# Patient Record
Sex: FEMALE | Race: WHITE | Employment: OTHER | URBAN - METROPOLITAN AREA
[De-identification: names, ages, dates, MRNs, and addresses within clinical notes are randomized per-mention and may not be internally consistent; named-entity substitution may affect disease eponyms.]

---

## 2018-10-23 ENCOUNTER — APPOINTMENT (OUTPATIENT)
Dept: CT IMAGING | Facility: CLINIC | Age: 33
End: 2018-10-23
Attending: EMERGENCY MEDICINE
Payer: OTHER GOVERNMENT

## 2018-10-23 ENCOUNTER — HOSPITAL ENCOUNTER (EMERGENCY)
Facility: CLINIC | Age: 33
Discharge: HOME | End: 2018-10-24
Attending: EMERGENCY MEDICINE | Admitting: EMERGENCY MEDICINE
Payer: OTHER GOVERNMENT

## 2018-10-23 ENCOUNTER — OFFICE VISIT (OUTPATIENT)
Dept: URGENT CARE | Facility: URGENT CARE | Age: 33
End: 2018-10-23
Payer: OTHER GOVERNMENT

## 2018-10-23 VITALS
TEMPERATURE: 97.8 F | OXYGEN SATURATION: 98 % | WEIGHT: 121.3 LBS | HEART RATE: 110 BPM | RESPIRATION RATE: 12 BRPM | BODY MASS INDEX: 21.49 KG/M2 | SYSTOLIC BLOOD PRESSURE: 126 MMHG | DIASTOLIC BLOOD PRESSURE: 78 MMHG

## 2018-10-23 DIAGNOSIS — R51.9 NONINTRACTABLE HEADACHE, UNSPECIFIED CHRONICITY PATTERN, UNSPECIFIED HEADACHE TYPE: ICD-10-CM

## 2018-10-23 DIAGNOSIS — R51.9 ACUTE INTRACTABLE HEADACHE, UNSPECIFIED HEADACHE TYPE: Primary | ICD-10-CM

## 2018-10-23 LAB
ALBUMIN SERPL-MCNC: 3.5 G/DL (ref 3.4–5)
ALP SERPL-CCNC: 73 U/L (ref 40–150)
ALT SERPL W P-5'-P-CCNC: 29 U/L (ref 0–50)
ANION GAP SERPL CALCULATED.3IONS-SCNC: 6 MMOL/L (ref 3–14)
AST SERPL W P-5'-P-CCNC: 25 U/L (ref 0–45)
BASOPHILS # BLD AUTO: 0 10E9/L (ref 0–0.2)
BASOPHILS NFR BLD AUTO: 0.2 %
BILIRUB SERPL-MCNC: 0.6 MG/DL (ref 0.2–1.3)
BUN SERPL-MCNC: 9 MG/DL (ref 7–30)
CALCIUM SERPL-MCNC: 8.3 MG/DL (ref 8.5–10.1)
CHLORIDE SERPL-SCNC: 106 MMOL/L (ref 94–109)
CO2 SERPL-SCNC: 28 MMOL/L (ref 20–32)
CREAT SERPL-MCNC: 0.78 MG/DL (ref 0.52–1.04)
DEPRECATED S PYO AG THROAT QL EIA: NORMAL
DIFFERENTIAL METHOD BLD: NORMAL
EOSINOPHIL # BLD AUTO: 0.2 10E9/L (ref 0–0.7)
EOSINOPHIL NFR BLD AUTO: 1.5 %
ERYTHROCYTE [DISTWIDTH] IN BLOOD BY AUTOMATED COUNT: 12 % (ref 10–15)
GFR SERPL CREATININE-BSD FRML MDRD: 84 ML/MIN/1.7M2
GLUCOSE SERPL-MCNC: 83 MG/DL (ref 70–99)
HCG SERPL QL: NEGATIVE
HCT VFR BLD AUTO: 40.8 % (ref 35–47)
HGB BLD-MCNC: 14.4 G/DL (ref 11.7–15.7)
IMM GRANULOCYTES # BLD: 0 10E9/L (ref 0–0.4)
IMM GRANULOCYTES NFR BLD: 0.2 %
LACTATE BLD-SCNC: 0.4 MMOL/L (ref 0.7–2)
LYMPHOCYTES # BLD AUTO: 2.2 10E9/L (ref 0.8–5.3)
LYMPHOCYTES NFR BLD AUTO: 20.4 %
MCH RBC QN AUTO: 32.1 PG (ref 26.5–33)
MCHC RBC AUTO-ENTMCNC: 35.3 G/DL (ref 31.5–36.5)
MCV RBC AUTO: 91 FL (ref 78–100)
MONOCYTES # BLD AUTO: 1.3 10E9/L (ref 0–1.3)
MONOCYTES NFR BLD AUTO: 11.8 %
NEUTROPHILS # BLD AUTO: 7.1 10E9/L (ref 1.6–8.3)
NEUTROPHILS NFR BLD AUTO: 65.9 %
NRBC # BLD AUTO: 0 10*3/UL
NRBC BLD AUTO-RTO: 0 /100
PLATELET # BLD AUTO: 234 10E9/L (ref 150–450)
POTASSIUM SERPL-SCNC: 3.5 MMOL/L (ref 3.4–5.3)
PROT SERPL-MCNC: 6.8 G/DL (ref 6.8–8.8)
RBC # BLD AUTO: 4.49 10E12/L (ref 3.8–5.2)
SODIUM SERPL-SCNC: 140 MMOL/L (ref 133–144)
SPECIMEN SOURCE: NORMAL
WBC # BLD AUTO: 10.7 10E9/L (ref 4–11)

## 2018-10-23 PROCEDURE — 80053 COMPREHEN METABOLIC PANEL: CPT | Performed by: EMERGENCY MEDICINE

## 2018-10-23 PROCEDURE — 96374 THER/PROPH/DIAG INJ IV PUSH: CPT

## 2018-10-23 PROCEDURE — 84703 CHORIONIC GONADOTROPIN ASSAY: CPT | Performed by: EMERGENCY MEDICINE

## 2018-10-23 PROCEDURE — 87880 STREP A ASSAY W/OPTIC: CPT | Performed by: EMERGENCY MEDICINE

## 2018-10-23 PROCEDURE — 25000128 H RX IP 250 OP 636: Performed by: EMERGENCY MEDICINE

## 2018-10-23 PROCEDURE — 99285 EMERGENCY DEPT VISIT HI MDM: CPT | Mod: 25

## 2018-10-23 PROCEDURE — 85025 COMPLETE CBC W/AUTO DIFF WBC: CPT | Performed by: EMERGENCY MEDICINE

## 2018-10-23 PROCEDURE — 96361 HYDRATE IV INFUSION ADD-ON: CPT

## 2018-10-23 PROCEDURE — 87081 CULTURE SCREEN ONLY: CPT | Performed by: EMERGENCY MEDICINE

## 2018-10-23 PROCEDURE — 70450 CT HEAD/BRAIN W/O DYE: CPT

## 2018-10-23 PROCEDURE — 99214 OFFICE O/P EST MOD 30 MIN: CPT | Performed by: STUDENT IN AN ORGANIZED HEALTH CARE EDUCATION/TRAINING PROGRAM

## 2018-10-23 PROCEDURE — 83605 ASSAY OF LACTIC ACID: CPT | Performed by: EMERGENCY MEDICINE

## 2018-10-23 PROCEDURE — 96375 TX/PRO/DX INJ NEW DRUG ADDON: CPT

## 2018-10-23 RX ORDER — SODIUM CHLORIDE 9 MG/ML
1000 INJECTION, SOLUTION INTRAVENOUS CONTINUOUS
Status: DISCONTINUED | OUTPATIENT
Start: 2018-10-23 | End: 2018-10-24 | Stop reason: HOSPADM

## 2018-10-23 RX ORDER — DEXAMETHASONE SODIUM PHOSPHATE 10 MG/ML
10 INJECTION, SOLUTION INTRAMUSCULAR; INTRAVENOUS ONCE
Status: COMPLETED | OUTPATIENT
Start: 2018-10-23 | End: 2018-10-23

## 2018-10-23 RX ORDER — DIPHENHYDRAMINE HYDROCHLORIDE 50 MG/ML
25 INJECTION INTRAMUSCULAR; INTRAVENOUS ONCE
Status: COMPLETED | OUTPATIENT
Start: 2018-10-23 | End: 2018-10-23

## 2018-10-23 RX ADMIN — SODIUM CHLORIDE 1000 ML: 9 INJECTION, SOLUTION INTRAVENOUS at 22:50

## 2018-10-23 RX ADMIN — DEXAMETHASONE SODIUM PHOSPHATE 10 MG: 10 INJECTION, SOLUTION INTRAMUSCULAR; INTRAVENOUS at 22:49

## 2018-10-23 RX ADMIN — DIPHENHYDRAMINE HYDROCHLORIDE 25 MG: 50 INJECTION, SOLUTION INTRAMUSCULAR; INTRAVENOUS at 22:49

## 2018-10-23 RX ADMIN — PROCHLORPERAZINE EDISYLATE 10 MG: 5 INJECTION INTRAMUSCULAR; INTRAVENOUS at 22:49

## 2018-10-23 ASSESSMENT — ENCOUNTER SYMPTOMS
FEVER: 0
COUGH: 0
HEADACHES: 1
NAUSEA: 0
SORE THROAT: 1
PHOTOPHOBIA: 1
VOMITING: 0

## 2018-10-23 NOTE — ED AVS SNAPSHOT
Emergency Department    [address] [place] [address]    Phone:  [phone]    Fax:  [phone]                                       Wendy Fisher   MRN: [id number]    Department:   Emergency Department   Date of Visit:  10/23/2018           After Visit Summary Signature Page     I have received my discharge instructions, and my questions have been answered. I have discussed any challenges I see with this plan with the nurse or doctor.    ..........................................................................................................................................  Patient/Patient Representative Signature      ..........................................................................................................................................  Patient Representative Print Name and Relationship to Patient    ..................................................               ................................................  Date                                   Time    ..........................................................................................................................................  Reviewed by Signature/Title    ...................................................              ..............................................  Date                                               Time          22EPIC Rev 08/18

## 2018-10-23 NOTE — MR AVS SNAPSHOT
"              After Visit Summary   10/23/2018    Wendy Fisher    MRN: [id number]           Patient Information     Date Of Birth          1985        Visit Information        Provider Department      10/23/2018 8:55 PM Andrés Cullen MD [facility]        Care Instructions    As we discussed you have a new onset different quality of headache with concerning symptoms.  You need to go to the local ED for further evaluation, IV fluids, medications and monitoring.  I feel that you are currently safe to be transported by your  there.      Andrés Cullen MD          Follow-ups after your visit        Who to contact     If you have questions or need follow up information about today's clinic visit or your schedule please contact [facility] directly at [phone].  Normal or non-critical lab and imaging results will be communicated to you by MyChart, letter or phone within 4 business days after the clinic has received the results. If you do not hear from us within 7 days, please contact the clinic through MyChart or phone. If you have a critical or abnormal lab result, we will notify you by phone as soon as possible.  Submit refill requests through [employer] or call your pharmacy and they will forward the refill request to us. Please allow 3 business days for your refill to be completed.          Additional Information About Your Visit        MyChart Information     [employer] lets you send messages to your doctor, view your test results, renew your prescriptions, schedule appointments and more. To sign up, go to [email or web address] . Click on \"Log in\" on the left side of the screen, which will take you to the Welcome page. Then click on \"Sign up Now\" on the right side of the page.     You will be asked to enter the access code listed below, as well as some personal information. Please follow the directions to create your username and " password.     Your access code is: [email or web address]  Expires: 2019  9:30 PM     Your access code will  in 90 days. If you need help or a new code, please call your [place] clinic or [phone].        Care EveryWhere ID     This is your Care EveryWhere ID. This could be used by other organizations to access your [place] medical records  [id number]        Your Vitals Were     Pulse Temperature Respirations Pulse Oximetry BMI (Body Mass Index)       110 97.8  F (36.6  C) (Oral) 12 98% 21.49 kg/m2        Blood Pressure from Last 3 Encounters:   10/23/18 126/78   16 (!) 152/94   14 146/82    Weight from Last 3 Encounters:   10/23/18 121 lb 4.8 oz (55 kg)   14 170 lb (77.1 kg)              Today, you had the following     No orders found for display       Primary Care Provider Office Phone # Fax #    [facility] [phone] [address] [facility] [address]        Equal Access to Services     TOBY GREEN : Hadii aad ku hadasho Soomaali, waaxda luqadaha, qaybta kaalmada adeegyada, roya [place] . So [facility] [phone].    ATENCIÓN: Si habla español, tiene a lyles disposición servicios gratuitos de asistencia lingüística. Llame al [phone].    We comply with applicable federal civil rights laws and [place] laws. We do not discriminate on the basis of race, color, national origin, age, disability, sex, sexual orientation, or gender identity.            Thank you!     Thank you for choosing [facility]  for your care. Our goal is always to provide you with excellent care. Hearing back from our patients is one way we can continue to improve our services. Please take a few minutes to complete the written survey that you may receive in the mail after your visit with us. Thank you!             Your Updated Medication List - Protect others around you: Learn how to safely use, store and throw away  your medicines at www.disposemymeds.org.          This list is accurate as of 10/23/18  9:30 PM.  Always use your most recent med list.                   Brand Name Dispense Instructions for use Diagnosis    erythromycin ophthalmic ointment    ROMYCIN    1 Tube    Place 1 Application into the right eye 3 times daily X 7 days        HYDROcodone-acetaminophen 5-325 MG per tablet    NORCO    15 tablet    Take 1-2 tablets by mouth every 4 hours as needed for moderate to severe pain        ibuprofen 600 MG tablet    ADVIL/MOTRIN    60 tablet    Take 1 tablet (600 mg) by mouth every 6 hours as needed for moderate pain    Twin pregnancy, delivered by  section, current hospitalization in third trimester       LEVOTHYROXINE SODIUM PO      Take 75 mcg by mouth daily        oxyCODONE-acetaminophen 5-325 MG per tablet    PERCOCET    30 tablet    Take 1-2 tablets by mouth every 6 hours as needed for moderate to severe pain    Twin pregnancy, delivered by  section, current hospitalization in third trimester       PROZAC PO      Take 40 mg by mouth        WELLBUTRIN PO

## 2018-10-23 NOTE — ED AVS SNAPSHOT
Emergency Department    [address] [facility] [address]    Phone:  [phone]    Fax:  [phone]                                       Wendy Fisher   MRN: [id number]    Department:   Emergency Department   Date of Visit:  10/23/2018           Patient Information     Date Of Birth          1985        Your diagnoses for this visit were:     Nonintractable headache, unspecified chronicity pattern, unspecified headache type        You were seen by Alvin Tsang MD and Andrés Smalls MD.      Follow-up Information     Schedule an appointment as soon as possible for a visit with Clinic, [facility].    Contact information:    [address] [facility] [address] [phone]          Follow up with  Emergency Department.    Specialty:  EMERGENCY MEDICINE    Why:  If symptoms worsen    Contact information:    [address] [facility] [address] [phone]        Discharge Instructions       Discharge Instructions  Headache    You were seen today for a headache. Headaches may be caused by many different things such as muscle tension, sinus inflammation, anxiety and stress, having too little sleep, too much alcohol, some medical conditions or injury. You may have a migraine, which is caused by changes in the blood vessels in your head.  At this time your provider does not find that your headache is a sign of anything dangerous or life-threatening.  However, sometimes the signs of serious illness do not show up right away.      Generally, every Emergency Department visit should have a follow-up clinic visit with either a primary or a specialty clinic/provider. Please follow-up as instructed by your emergency provider today.    Return to the Emergency Department if:    You get a new fever of 100.4 F or higher.    Your headache gets much worse.    You get a stiff neck with your headache.    You get a new headache that is significantly  different or worse than headaches you have had before.    You are vomiting (throwing up) and cannot keep food or water down.    You have blurry or double vision or other problems with your eyes.    You have a new weakness on one side of your body.    You have difficulty with balance which is new.    You or your family thinks you are confused.    You have a seizure.    What can I do to help myself?    Pain medications - You may take a pain medication such as Tylenol  (acetaminophen), Advil , Motrin  (ibuprofen) or Aleve  (naproxen).    Take a pain reliever as soon as you notice symptoms.  Starting medications as soon as you start to have symptoms may lessen the amount of pain you have.    Relaxing in a quiet, dark room may help.    Get enough sleep and eat meals regularly.    You may need to watch for certain foods or other things which may trigger your headaches.  Keeping a journal of your headaches and possible triggers may help you and your primary provider to identify things which you should avoid which may be causing your headaches.  If you were given a prescription for medicine here today, be sure to read all of the information (including the package insert) that comes with your prescription.  This will include important information about the medicine, its side effects, and any warnings that you need to know about.  The pharmacist who fills the prescription can provide more information and answer questions you may have about the medicine.  If you have questions or concerns that the pharmacist cannot address, please call or return to the Emergency Department.   Remember that you can always come back to the Emergency Department if you are not able to see your regular provider in the amount of time listed above, if you get any new symptoms, or if there is anything that worries you.      24 Hour Appointment Hotline       To make an appointment at any [facility], call [phone] ([phone]). If you  don't have a family doctor or clinic, we will help you find one. [facility] clinics are conveniently located to serve the needs of you and your family.             Review of your medicines      Our records show that you are taking the medicines listed below. If these are incorrect, please call your family doctor or clinic.        Dose / Directions Last dose taken    erythromycin ophthalmic ointment   Commonly known as:  ROMYCIN   Dose:  1 Application   Quantity:  1 Tube        Place 1 Application into the right eye 3 times daily X 7 days   Refills:  0        HYDROcodone-acetaminophen 5-325 MG per tablet   Commonly known as:  NORCO   Dose:  1-2 tablet   Quantity:  15 tablet        Take 1-2 tablets by mouth every 4 hours as needed for moderate to severe pain   Refills:  0        ibuprofen 600 MG tablet   Commonly known as:  ADVIL/MOTRIN   Dose:  600 mg   Quantity:  60 tablet        Take 1 tablet (600 mg) by mouth every 6 hours as needed for moderate pain   Refills:  0        LEVOTHYROXINE SODIUM PO   Dose:  75 mcg   Indication:  Underactive Thyroid        Take 75 mcg by mouth daily   Refills:  0        oxyCODONE-acetaminophen 5-325 MG per tablet   Commonly known as:  PERCOCET   Dose:  1-2 tablet   Quantity:  30 tablet        Take 1-2 tablets by mouth every 6 hours as needed for moderate to severe pain   Refills:  0        PROZAC PO   Dose:  40 mg        Take 40 mg by mouth   Refills:  0        WELLBUTRIN PO        Refills:  0                Procedures and tests performed during your visit     Beta strep group A culture    CBC with platelets differential    CT Head w/o Contrast    Comprehensive metabolic panel    HCG qualitative Blood    Lactic acid whole blood    Patient care order - Give patient blankets, pillows, toothbrush.  Assist with ADLs as needed.    Peripheral IV catheter    Rapid strep screen    Vital signs      Orders Needing Specimen Collection     None      Pending Results     Date and Time Order Name  Status Description    10/23/2018 2215 Beta strep group A culture Preliminary             Pending Culture Results     Date and Time Order Name Status Description    10/23/2018 2215 Beta strep group A culture Preliminary             Pending Results Instructions     If you had any lab results that were not finalized at the time of your Discharge, you can call the ED Lab Result RN at [phone]. You will be contacted by this team for any positive Lab results or changes in treatment. The nurses are available 7 days a week from 10A to 6:30P.  You can leave a message 24 hours per day and they will return your call.        Test Results From Your Hospital Stay        10/23/2018 10:48 PM      Component Results     Component Value Ref Range & Units Status    WBC 10.7 4.0 - 11.0 10e9/L Final    RBC Count 4.49 3.8 - 5.2 10e12/L Final    Hemoglobin 14.4 11.7 - 15.7 g/dL Final    Hematocrit 40.8 35.0 - 47.0 % Final    MCV 91 78 - 100 fl Final    MCH 32.1 26.5 - 33.0 pg Final    MCHC 35.3 31.5 - 36.5 g/dL Final    RDW 12.0 10.0 - 15.0 % Final    Platelet Count 234 150 - 450 10e9/L Final    Diff Method Automated Method  Final    % Neutrophils 65.9 % Final    % Lymphocytes 20.4 % Final    % Monocytes 11.8 % Final    % Eosinophils 1.5 % Final    % Basophils 0.2 % Final    % Immature Granulocytes 0.2 % Final    Nucleated RBCs 0 0 /100 Final    Absolute Neutrophil 7.1 1.6 - 8.3 10e9/L Final    Absolute Lymphocytes 2.2 0.8 - 5.3 10e9/L Final    Absolute Monocytes 1.3 0.0 - 1.3 10e9/L Final    Absolute Eosinophils 0.2 0.0 - 0.7 10e9/L Final    Absolute Basophils 0.0 0.0 - 0.2 10e9/L Final    Abs Immature Granulocytes 0.0 0 - 0.4 10e9/L Final    Absolute Nucleated RBC 0.0  Final         10/23/2018 11:16 PM      Component Results     Component Value Ref Range & Units Status    Sodium 140 133 - 144 mmol/L Final    Potassium 3.5 3.4 - 5.3 mmol/L Final    Chloride 106 94 - 109 mmol/L Final    Carbon Dioxide 28 20 - 32 mmol/L Final    Anion  Gap 6 3 - 14 mmol/L Final    Glucose 83 70 - 99 mg/dL Final    Urea Nitrogen 9 7 - 30 mg/dL Final    Creatinine 0.78 0.52 - 1.04 mg/dL Final    GFR Estimate 84 >60 mL/min/1.7m2 Final    Non  GFR Calc    GFR Estimate If Black >90 >60 mL/min/1.7m2 Final    African American GFR Calc    Calcium 8.3 (L) 8.5 - 10.1 mg/dL Final    Bilirubin Total 0.6 0.2 - 1.3 mg/dL Final    Albumin 3.5 3.4 - 5.0 g/dL Final    Protein Total 6.8 6.8 - 8.8 g/dL Final    Alkaline Phosphatase 73 40 - 150 U/L Final    ALT 29 0 - 50 U/L Final    AST 25 0 - 45 U/L Final         10/23/2018 10:52 PM      Component Results     Component Value Ref Range & Units Status    Lactic Acid 0.4 (L) 0.7 - 2.0 mmol/L Final         10/23/2018 11:52 PM      Narrative     CT HEAD W/O CONTRAST  10/23/2018 11:18 PM     HISTORY: Headache.    TECHNIQUE: Axial images of the head and coronal reformations without  IV contrast material. Radiation dose for this scan was reduced using  automated exposure control, adjustment of the mA and/or kV according  to patient size, or iterative reconstruction technique.    COMPARISON: None.    FINDINGS: No intracranial hemorrhage, mass or mass effect. No acute  infarct identified. No shift of midline structures. The ventricles are  symmetric. Visualized paranasal sinuses are clear.        Impression     IMPRESSION: No acute intracranial abnormality.    ARNALDO LINDSAY MD         10/23/2018 11:04 PM      Component Results     Component Value Ref Range & Units Status    HCG Qualitative Serum Negative NEG^Negative Final    This test is for screening purposes.  Results should be interpreted along with   the clinical picture.  Confirmation testing is available if warranted by   ordering [id number], HCG Quantitative Pregnancy.           10/23/2018 10:52 PM      Component Results     Component    Specimen Description    Throat    Rapid Strep A Screen    NEGATIVE: No Group A streptococcal antigen detected by immunoassay, await  culture report.         10/24/2018  1:12 AM      Component Results     Component    Specimen Description    Throat    Special Requests    Specimen collected in eSwab transport (white cap)    Culture Micro    PENDING                Clinical Quality Measure: Blood Pressure Screening     Your blood pressure was checked while you were in the emergency department today. The last reading we obtained was  BP: 119/65 . Please read the guidelines below about what these numbers mean and what you should do about them.  If your systolic blood pressure (the top number) is less than 120 and your diastolic blood pressure (the bottom number) is less than 80, then your blood pressure is normal. There is nothing more that you need to do about it.  If your systolic blood pressure (the top number) is 120-139 or your diastolic blood pressure (the bottom number) is 80-89, your blood pressure may be higher than it should be. You should have your blood pressure rechecked within a year by a primary care provider.  If your systolic blood pressure (the top number) is 140 or greater or your diastolic blood pressure (the bottom number) is 90 or greater, you may have high blood pressure. High blood pressure is treatable, but if left untreated over time it can put you at risk for heart attack, stroke, or kidney failure. You should have your blood pressure rechecked by a primary care provider within the next 4 weeks.  If your provider in the emergency department today gave you specific instructions to follow-up with your doctor or provider even sooner than that, you should follow that instruction and not wait for up to 4 weeks for your follow-up visit.        Thank you for choosing [facility]       Thank you for choosing [facility] for your care. Our goal is always to provide you with excellent care. Hearing back from our patients is one way we can continue to improve our services. Please take a few minutes to complete the written survey that you may  "receive in the mail after you visit with us. Thank you!        [employer] Information     for; to (do) Centers lets you send messages to your doctor, view your test results, renew your prescriptions, schedule appointments and more. To sign up, go to [email or web address]; to (do) Centers . Click on \"Log in\" on the left side of the screen, which will take you to the Welcome page. Then click on \"Sign up Now\" on the right side of the page.     You will be asked to enter the access code listed below, as well as some personal information. Please follow the directions to create your username and password.     Your access code is: [email or web address]  Expires: 2019  9:30 PM     Your access code will  in 90 days. If you need help or a new code, please call your [place] clinic or [phone].        Care EveryWhere ID     This is your Care EveryWhere ID. This could be used by other organizations to access your [place] medical records  [id number]        Equal Access to Services     [facility] campusTERESA : Hadii mar sánchezo Socher, waaxda luqadaha, qaybta kaalmada adejose rafael, roya [place] . So [facility] [phone].    ATENCIÓN: Si habla español, tiene a lyles disposición servicios gratuitos de asistencia lingüística. Llame al [phone].    We comply with applicable federal civil rights laws and [place] laws. We do not discriminate on the basis of race, color, national origin, age, disability, sex, sexual orientation, or gender identity.            After Visit Summary       This is your record. Keep this with you and show to your community pharmacist(s) and doctor(s) at your next visit.                  "

## 2018-10-24 VITALS
TEMPERATURE: 98.7 F | OXYGEN SATURATION: 96 % | DIASTOLIC BLOOD PRESSURE: 65 MMHG | HEIGHT: 64 IN | BODY MASS INDEX: 21 KG/M2 | WEIGHT: 123 LBS | HEART RATE: 68 BPM | SYSTOLIC BLOOD PRESSURE: 119 MMHG | RESPIRATION RATE: 18 BRPM

## 2018-10-24 NOTE — PROGRESS NOTES
SUBJECTIVE:   Wendy Fisher is a 33 year old female who presents to clinic today for the following health issues:    Headaches      Duration: 1 day    Description  Location: unilateral in the left temporal area   Character: throbbing pain, sharp pain  Frequency:  N/A  Duration:  Constant today    Intensity:  severe    Accompanying signs and symptoms:    Precipitating or Alleviating factors:  Nausea/vomiting: yes  Dizziness: yes  Weakness or numbness: no  Visual changes: none  Fever: no   Sinus or URI symptoms no     History  Head trauma: no   Family history of migraines: no   Previous tests for headaches: no  Neurologist evaluations: no  Able to do daily activities when headache present: YES  Wake with headaches: n/a  Daily pain medication use: no   Any changes in: family ( and extended family members)    Precipitating or Alleviating factors (light/sound/sleep/caffeine): None apprecaited    Therapies tried and outcome: Ibuprofen (Advil, Motrin)    Outcome - not effective    Acute onset this morning, constant.  Waking up from naps with headache.  Worse than any headache previously.  No therapies or known triggers.  Denies any substance use.      Problem list and histories reviewed & adjusted, as indicated.  Additional history: as documented    Patient Active Problem List   Diagnosis     Twin pregnancy, delivered by  section, current hospitalization in third trimester      delivery delivered     Anemia in pregnancy, delivered, current hospitalization     Past Surgical History:   Procedure Laterality Date      SECTION N/A 2014    Procedure:  SECTION;  Surgeon: Andrés Short MD;  Location:  L+D     COLONOSCOPY       [place] teeth removal         Social History   Substance Use Topics     Smoking status: Former Smoker     Smokeless tobacco: Not on file     Alcohol use No     Family History   Problem Relation Age of Onset     Cancer Paternal Grandmother      Cancer  Maternal Grandfather      Hypertension Father      Thyroid Disease Father          Current Outpatient Prescriptions   Medication Sig Dispense Refill     BuPROPion HCl (WELLBUTRIN PO)        LEVOTHYROXINE SODIUM PO Take 75 mcg by mouth daily       oxyCODONE-acetaminophen (PERCOCET) 5-325 MG per tablet Take 1-2 tablets by mouth every 6 hours as needed for moderate to severe pain 30 tablet 0     erythromycin (ROMYCIN) ophthalmic ointment Place 1 Application into the right eye 3 times daily X 7 days (Patient not taking: Reported on 10/23/2018) 1 Tube 0     FLUoxetine HCl (PROZAC PO) Take 40 mg by mouth       HYDROcodone-acetaminophen (NORCO) 5-325 MG per tablet Take 1-2 tablets by mouth every 4 hours as needed for moderate to severe pain (Patient not taking: Reported on 10/23/2018) 15 tablet 0     ibuprofen (ADVIL,MOTRIN) 600 MG tablet Take 1 tablet (600 mg) by mouth every 6 hours as needed for moderate pain (Patient not taking: Reported on 10/23/2018) 60 tablet 0     No Known Allergies  BP Readings from Last 3 Encounters:   10/23/18 126/78   04/09/16 (!) 152/94   09/13/14 146/82    Wt Readings from Last 3 Encounters:   10/23/18 121 lb 4.8 oz (55 kg)   09/09/14 170 lb (77.1 kg)                    Reviewed and updated as needed this visit by clinical staff  Allergies  Meds       Reviewed and updated as needed this visit by Provider         ROS:  A focused ROS was obtained and documented for notable findings in the HPI as stated above.    OBJECTIVE:     /78  Pulse 110  Temp 97.8  F (36.6  C) (Oral)  Resp 12  Wt 121 lb 4.8 oz (55 kg)  SpO2 98%  BMI 21.49 kg/m2  Body mass index is 21.49 kg/(m^2).  GENERAL: Restless unable to sit still for more than 3-5 seconds.  In moderate distress.    EYES: 2-3 mm pupils, bilaterally, reactive to light but sluggish, conjunctivae and sclerae normal  HENT: ear canals and TM's normal, no Jansen's sign, petechiae on soft palate of mouth  NECK: no adenopathy, no asymmetry,  masses.  Tender to palpation of posterior neck.    RESP: lungs clear to auscultation - no rales, rhonchi or wheezes.  Intermittent labored breathing.    CV: Tachycardia with normal rhythm, normal S1 S2, no S3 or S4, no murmur, click or rub, no peripheral edema and peripheral pulses strong  ABDOMEN: soft, nontender, no hepatosplenomegaly, no masses and bowel sounds normal  MS: no gross musculoskeletal defects noted, no edema  SKIN: no suspicious lesions or rashes  NEURO: Sluggish pupils bilaterally.  Slightly decreased strength in left upper extremity compared to right.  Equal strength bilaterally in lower extremity.  Unable to perform heel to shin or finger to nose despite instruction.  Gross sensation equal bilaterally in upper extremities.    PSYCH: Non-linear thought process, intermittent incomprehensible speech process.  Poor eye contact and slightly disheveled.      Diagnostic Test Results:  none     ASSESSMENT/PLAN:   1. Acute intractable headache, unspecified headache type  Broad differential however most concerned for subarachnoid hemorrhage vs meningitis (aspectic) vs atypical migraines vs headache, primary vs rebound, 2/2 substance abuse disorder.  Patient restless throughout encounter with high suspicion of substance use despite adamantantly denying during history portion.  Partial neurologic examination concerning for sluggish pupils, tender palpation of neck, slightly decreased strength in left upper extremity and petechiae on soft palate of mouth.  Based on my clinical suspicion in combination with abnormal examination recommended further evaluation in local ED; discussed with  and deemed safe to transport not via EMS if he could take her immediately to ED.  He voiced understanding and agreement with the plan.    - recommended immediate evaluation in the ED for further laboratory testing, imaging, IV medications, and monitoring    See Patient Instructions    Andrés Cullen MD  [place]  [facility]

## 2018-10-24 NOTE — ED PROVIDER NOTES
History     Chief Complaint:    Headache     HPI   Wendy Fisher is a 33 year old female who presents with a headache.  She first presented to urgent care and was sent here for further evaluation and management. The patient reports that she started to develop a headache this morning accompanied by a sore throat, which has worsened throughout the day. She indicates that she is also having slight sensitivity to light. The patient has some nausea, but she has not had any vomiting, fever, cough, ear pain, runny nose, or any other symptoms.  She also denies having had any history of headaches this severe, history of migraines, or other medical issues. To combat the symptoms she has taken ibuprofen a few hours ago. She notes that she has not taken her Wellbutrin for a few days due to running out, but has never had symptoms like this previously when she has run out.     Allergies:  No known drug allergies.       Medications:    Wellbutrin PO  Levothyroxine   Prozac  Romycin      Past Medical History:    Abnormal Pap smear  Anemia in pregnancy  Anxiety   labor  Thyroid disease    Past Surgical History:    C section   Colonoscopy     Family History:    Paternal Grandmother: Cancer  Maternal Grandfather: Cancer  Father: Hypertension, Thyroid Disease     Social History:  The patient was accompanied to the ED by .  Smoking Status: Current everyday smoker  Alcohol Use: No   Marital Status:       Review of Systems   Constitutional: Negative for fever.   HENT: Positive for sore throat. Negative for ear pain.    Eyes: Positive for photophobia.   Respiratory: Negative for cough.    Gastrointestinal: Negative for nausea and vomiting.   Neurological: Positive for headaches.   All other systems reviewed and are negative.    Physical Exam     Patient Vitals for the past 24 hrs:   BP Temp Temp src Pulse Heart Rate Resp SpO2 Height Weight   10/23/18 2343 118/71 98.7  F (37.1  C) Oral 91 - 15 98 % - -   10/23/18 2201  "(!) 142/91 98.4  F (36.9  C) - - 122 18 97 % 1.626 m (5' 4\") 55.8 kg (123 lb)     Physical Exam  Nursing note and vitals reviewed.  Constitutional:  Oriented to person, place, and time. Cooperative.  Appears uncomfortable and slightly agitated.  HENT:   Nose:    Nose normal.   Mouth/Throat:   Some mild erythema to the soft palate but no exudate or tonsillar erythema.   Eyes:    Conjunctivae normal and EOM are normal.      Pupils are equal, round, and reactive to light.   Neck:    Trachea normal.   Cardiovascular:  Tachycardic rate, regular rhythm, normal heart sounds and normal pulses. No murmur heard.  Pulmonary/Chest:  Effort normal and breath sounds normal.   Abdominal:   Soft. Normal appearance and bowel sounds are normal.      There is no tenderness.      There is no rebound and no CVA tenderness.   Musculoskeletal:  Extremities atraumatic x 4.   Lymphadenopathy:  No cervical adenopathy.   Neurological:   Alert and oriented to person, place, and time. Normal strength.      No cranial nerve deficit or sensory deficit. GCS eye subscore is 4. GCS verbal subscore is 5. GCS motor subscore is 6.   Skin:    Skin is intact. No rash noted.   Psychiatric:   Psychomotor agitation present.    Emergency Department Course     Imaging:  Radiology findings were communicated with the patient who voiced understanding of the findings.    CT Head w/o Contrast  No acute intracranial abnormality.  ARNALDO LINDSAY MD  Reading per radiology    Laboratory:  Laboratory findings were communicated with the patient who voiced understanding of the findings.    CBC: WBC 10.7, HGB 14.4,   CMP: Calcium 8.3 (L) o/w WNL (Creatinine 0.78)  Lactic Acid (Resulted: 2252): 0.4 (L)  HCG qualitative blood: negative  Rapid step screen: negative  Beta strep group A culture: pending    Interventions:  2249 compazine 10 mg IV  2249 benadryl 25 mg IV  2249 decadron 10 mg IV  2250 NS 1000 mL IV    Emergency Department Course:    2201 Nursing notes " and vitals reviewed.    2213 I performed an exam of the patient as documented above.     2215 A throat culture sample was obtained for laboratory testing as documented above.    2230 IV was inserted and blood was drawn for laboratory testing, results above.     2317 The patient was sent for a CT while in the emergency department, results above.     0042 I personally reviewed the imaging and lab results with the patient and answered all related questions prior to sign out to Dr. Smalls.    Impression & Plan      Medical Decision Making:  Wendy Fisher is a 33 year old female who presents to the emergency department today for evaluation of a headache.  Based on her presentation, I was somewhat suspicious that she might be using some drugs and specifically methamphetamines.  She had denied that to the urgent care though.  I also was concerned given the severity of her headache that she could have a subarachnoid hemorrhage or meningitis.  I also considered the possibility of strep throat.  I proceeded with the above workup, including the rapid strep, blood work, and CT scan of her head.  Her workup here is unremarkable, and she obtained significant relief with the above interventions.  However she also did become quite sleepy from those interventions.  I had discussed briefly obtaining a lumbar puncture to rule out meningitis.  The patient's initial reaction was to not proceed with that.  I indicated I would discuss it with her further, but then she became quite sleepy from the medications and I could not discuss it with her further.  I doubt she has meningitis though, and I doubt she has a subarachnoid hemorrhage given the negative workup so far.  I then had a discussion with her  about the possibility of drug use, and while he did not completely endorse any drug use, he gave the indication that it was true and that she is looking into going to [place] for treatment.  Therefore I suspect that her sleepiness now  is not only from the medications that we provided to her but also likely from coming down from methamphetamine use.  At this point though I am not comfortable discharging her home like this.  Therefore she will need to wake up appropriately.  I am signing her out to my partner Dr. Smalls, who will need to reassess her and discharge her appropriately.  Her  is comfortable with this as well.    Diagnosis:    ICD-10-CM    1. Nonintractable headache, unspecified chronicity pattern, unspecified headache type R51      Disposition:   The patient is signed out to my colleague, Dr. Smalls.    Scribe Disclosure:  I, Orla Severson, am serving as a scribe at 10:18 PM on 10/23/2018 to document services personally performed by Alvin Tsang MD based on my observations and the provider's statements to me.     EMERGENCY DEPARTMENT       Alvin Tsang MD  10/24/18 0107

## 2018-10-24 NOTE — ED NOTES
Patient laying in bed, patient drowsy, responsive to repeated verbal and physical stimuli only. Explained to patient need for urine sample, patient did not respond and displayed no evidence of learning. VSS, fluids infusing. Call light within reach. Will continue to monitor.

## 2018-10-24 NOTE — PATIENT INSTRUCTIONS
As we discussed you have a new onset different quality of headache with concerning symptoms.  You need to go to the local ED for further evaluation, IV fluids, medications and monitoring.  I feel that you are currently safe to be transported by your  there.      Andrés Cullen MD

## 2018-10-24 NOTE — ED NOTES
Patient asleep, still requiring repeated verbal and physical stimuli to wake up. Respirations even and unlabored. ER MD at bedside

## 2018-10-26 LAB
BACTERIA SPEC CULT: NORMAL
Lab: NORMAL
SPECIMEN SOURCE: NORMAL

## 2019-08-20 ENCOUNTER — PRE VISIT (OUTPATIENT)
Dept: SLEEP MEDICINE | Facility: CLINIC | Age: 34
End: 2019-08-20

## 2019-08-20 NOTE — TELEPHONE ENCOUNTER
"  1.  Reason for the visit:  Consult to discuss narcalepsy  2.  Referring provider and clinic name:  Dr. Sanabria  3.  Previous Sleep Doctor or Pulmonlogist (clinic name)?  None noted  4.  Records, Procedures, Imaging, and Labs (see below)  No records to obtain        All NOTES from previous office visits that pertain to why they are being seen in the Sleep Center    Previous Sleep Studies, Chest CT, Echos and reports that pertain to why they are seeing Sleep Center    All Sleep records that have been done in the last 2 years that pertain to why they are seeing Sleep Center            Are they being seen for continuation of care for Cpap/Bipap/Avap/Trilogy/Dental Device? none    If yes to above Who and Where was Device issued/currently getting supplies from? na    Are you currently on \"Supplemental Oxygen\" during the day or night?   na                                                                                                                                                      Please remind pt to bring Cpap machine and ask to arrive 15 minutes early to appointment due traffic and congestion                                                 5. Pt Sleep Center Packet received Message left asking pt to arrive 30 minutes early to appointment if no packet received.        Yes: \"please make sure that you bring this to your appointment completed, either the doctor will not see you until this completed or you may be asked to reschedule your appointment.\"     No: mail or email to the pt and explain, \"please make sure that you bring this to your appointment completed, either the doctor will not see you until this completed or you may be asked to reschedule your appointment.\"     ~If pt coming early to fill packet out, ask that they come 30 minutes prior to their appointment~     6. Has the pt's medication list been updated and preferred pharmacy added?     7. Has the allergy list been reviewed?    \"Thank you for choosing " "[facility] and we look forward to seeing you at your upcoming appointment\"     "

## 2019-09-12 ENCOUNTER — OFFICE VISIT (OUTPATIENT)
Dept: SLEEP MEDICINE | Facility: CLINIC | Age: 34
End: 2019-09-12
Payer: COMMERCIAL

## 2019-09-12 VITALS
OXYGEN SATURATION: 98 % | WEIGHT: 148 LBS | HEART RATE: 73 BPM | DIASTOLIC BLOOD PRESSURE: 65 MMHG | BODY MASS INDEX: 25.27 KG/M2 | RESPIRATION RATE: 16 BRPM | HEIGHT: 64 IN | SYSTOLIC BLOOD PRESSURE: 114 MMHG

## 2019-09-12 DIAGNOSIS — G47.419 NARCOLEPSY WITHOUT CATAPLEXY(347.00): Primary | ICD-10-CM

## 2019-09-12 LAB
AMPHETAMINES UR QL SCN: NEGATIVE
BARBITURATES UR QL: NEGATIVE
BENZODIAZ UR QL: NEGATIVE
CANNABINOIDS UR QL SCN: NEGATIVE
COCAINE UR QL: NEGATIVE
ETHANOL UR QL SCN: NEGATIVE
OPIATES UR QL SCN: NEGATIVE
PCP UR QL SCN: NEGATIVE

## 2019-09-12 PROCEDURE — 80320 DRUG SCREEN QUANTALCOHOLS: CPT | Performed by: INTERNAL MEDICINE

## 2019-09-12 PROCEDURE — 99205 OFFICE O/P NEW HI 60 MIN: CPT | Performed by: INTERNAL MEDICINE

## 2019-09-12 PROCEDURE — 80307 DRUG TEST PRSMV CHEM ANLYZR: CPT | Performed by: INTERNAL MEDICINE

## 2019-09-12 RX ORDER — BUPROPION HYDROCHLORIDE 200 MG/1
200 TABLET, EXTENDED RELEASE ORAL 2 TIMES DAILY
COMMUNITY
Start: 2019-07-16

## 2019-09-12 RX ORDER — LEVONORGESTREL/ETHIN.ESTRADIOL 0.15-0.03
200 TABLET ORAL DAILY
Refills: 0 | COMMUNITY
Start: 2019-08-10

## 2019-09-12 RX ORDER — LEVONORGESTREL/ETHIN.ESTRADIOL 0.1-0.02MG
1 TABLET ORAL
COMMUNITY
Start: 2019-07-11

## 2019-09-12 RX ORDER — GABAPENTIN 300 MG/1
1 CAPSULE ORAL DAILY
COMMUNITY
Start: 2019-05-31

## 2019-09-12 RX ORDER — LEVOTHYROXINE SODIUM 112 UG/1
112 TABLET ORAL DAILY
Refills: 0 | COMMUNITY
Start: 2019-08-22

## 2019-09-12 ASSESSMENT — MIFFLIN-ST. JEOR: SCORE: 1351.57

## 2019-09-12 NOTE — PATIENT INSTRUCTIONS
Urine tox screen today and in two weeks  Bright light each morning for 30 minutes  Call Josué Bautista to determine if you can go off Fluoxetine and Provigil  Actigraphy watch for two weeks  If you are off Fluoxetine and Provigil      Your BMI is Body mass index is 25.39 kg/m .  Weight management is a personal decision.  If you are interested in exploring weight loss strategies, the following discussion covers the approaches that may be successful. Body mass index (BMI) is one way to tell whether you are at a healthy weight, overweight, or obese. It measures your weight in relation to your height.  A BMI of 18.5 to 24.9 is in the healthy range. A person with a BMI of 25 to 29.9 is considered overweight, and someone with a BMI of 30 or greater is considered obese. More than two-thirds of American adults are considered overweight or obese.  Being overweight or obese increases the risk for further weight gain. Excess weight may lead to heart disease and diabetes.  Creating and following plans for healthy eating and physical activity may help you improve your health.  Weight control is part of healthy lifestyle and includes exercise, emotional health, and healthy eating habits. Careful eating habits lifelong are the mainstay of weight control. Though there are significant health benefits from weight loss, long-term weight loss with diet alone may be very difficult to achieve- studies show long-term success with dietary management in less than 10% of people. Attaining a healthy weight may be especially difficult to achieve in those with severe obesity. In some cases, medications, devices and surgical management might be considered.  What can you do?  If you are overweight or obese and are interested in methods for weight loss, you should discuss this with your provider.     Consider reducing daily calorie intake by 500 calories.     Keep a food journal.     Avoiding skipping meals, consider cutting portions  instead.    Diet combined with exercise helps maintain muscle while optimizing fat loss. Strength training is particularly important for building and maintaining muscle mass. Exercise helps reduce stress, increase energy, and improves fitness. Increasing exercise without diet control, however, may not burn enough calories to loose weight.       Start walking three days a week 10-20 minutes at a time    Work towards walking thirty minutes five days a week     Eventually, increase the speed of your walking for 1-2 minutes at time    In addition, we recommend that you review healthy lifestyles and methods for weight loss available through the National Institutes of Health patient information sites:  http://win.niddk.nih.gov/publications/index.htm    And look into health and wellness programs that may be available through your health insurance provider, employer, local community center, or [employer] club.    Weight management plan: Patient was referred to their PCP to discuss a diet and exercise plan.

## 2019-09-12 NOTE — PROGRESS NOTES
Sleep Center   Outpatient Sleep Medicine Consultation  September 12, 2019      Name: Wendy Fisher MRN# [id number]   Age: 34 year old YOB: 1985     Date of Consultation: September 12, 2019  Consultation is requested by: No referring provider defined for this encounter.  Primary care provider: Clinic, [facility] [place]         Reason for Sleep Consult:     Wendy Fisher is a 34 year old female with excessive sleepiness throughout her life.          Assessment and Plan:     Summary Sleep Diagnoses:      Hyperdsomnolence without features of narcolepsy rule out insufficient sleep or cognitive effects    Summary Recommendations:      Urine toxicology screen today    Patient to review possible discontinuation of SSRI and modafinil in preparation for testing for narcolepsy without cataplexy or idiopathic hypersomnolence    2 weeks of Actigraphy    Return to clinic in 2 weeks for polysomnography and multiple sleep latency testing with urine toxicology screen on same day.    Summary Counseling:  See instructions    Counseling included a comprehensive review of diagnostic and therapeutic strategies as well as risks of inadequate therapy.  Educational materials provided in instructions.             History of Present Illness:     Wendy Fisher is a 34 year old female who has had sleepiness since childhood without ever having features of cataplexy, hypnagogic hallucinations or sleep paralysis.  She has a long sleep requirement without sleep reduction and has no suggest struct of sleep apnea other than occasional snoring with alcohol.  She has rare episodes of restless leg syndrome before going to sleep.         SLEEP-WAKE SCHEDULE:     10 pm to 7am with few minute latency, little sleep movement and using alarm clock with occasionally sleeping through   Weekends 11pm 12 am spontanesous awakeing  Now in sober house treatment 5 mos including in patient for opiates and more recently methamphetamine      SCALES       SLEEP APNEA: [place] score         INSOMNIA:  Insomnia severity score:        SLEEPINESS: [place] sleepiness scale:18 fearful of driving long distances    SLEEP COMPLAINTS:  Cardio-respiratory    Snoring- occasional with alcohol/week  Dyspnea- denies  Morning headaches or confusion-denies  Coexisting Lung disease: denies    Coexisting Heart disease: denies    Does patient have a bed partner: denies  Has bed partner been sleeping separately because of snoring:  denies            RLS Screen: When you try to relax in the evening or sleep at  night, do you ever have unpleasant, restless feelings in your  legs that can be relieved by walking or movement? Rarely 2x/month    Periodic limb movement: denies    Narcolepsy:  sudden urges of sleep attacks with movies    Cataplexy:  denies     Sleep paralysis:  denies      Hallucinations:   denies       Sleep Behaviors:    Leg symptoms/movements: denies    Motor restlessness:denies    Night terrors: denies but acknowledges negative violent dream, escaping danger    Bruxism: denies    Automatic behaviors: none    Other subjective complaints:    Anxiety or rumination denies    Pain and discomfort at  night: denies    Waking up with heart pounding or racing: denies    GERD or aspiration:denies         Parasomnia:   NREM - denies recurrent persistent confusional arousal, night eating, sleep walking or sleep terrors   REM  - denies dream enactment; injuries              Medications:     Current Outpatient Medications   Medication Sig     buPROPion (WELLBUTRIN SR) 200 MG 12 hr tablet Take 200 mg by mouth 2 times daily     FLUoxetine (PROZAC) 20 MG capsule Take 20 mg by mouth daily     FLUoxetine HCl (PROZAC PO) Take 40 mg by mouth     gabapentin (NEURONTIN) 300 MG capsule Take 1 capsule by mouth daily     ibuprofen (ADVIL,MOTRIN) 600 MG tablet Take 1 tablet (600 mg) by mouth every 6 hours as needed for moderate pain     levonorgestrel-ethinyl estradiol  "(AVIANE/ALESSE/LESSINA) 0.1-20 MG-MCG tablet Take 1 tablet by mouth     levothyroxine (SYNTHROID/LEVOTHROID) 112 MCG tablet Take 112 mcg by mouth daily     LEVOTHYROXINE SODIUM PO Take 75 mcg by mouth daily     PROVIGIL 200 MG tablet Take 200 mg by mouth daily     No current facility-administered medications for this visit.         No Known Allergies         Past Medical History:     Does not need 02 supplement at night   Past Medical History:   Diagnosis Date     Abnormal Pap smear      Anemia in pregnancy, delivered, current hospitalization 2014     Anxiety       delivery delivered 2014      labor 2014     Thyroid disease      Twin pregnancy, delivered by  section, current hospitalization in third trimester 2014             Past Surgical History:      Past Surgical History:   Procedure Laterality Date      SECTION N/A 2014    Procedure:  SECTION;  Surgeon: Andrés Short MD;  Location:  L+D     COLONOSCOPY       [place] teeth removal              Social History:     Social History     Tobacco Use     Smoking status: Current Every Day Smoker     Types: Cigarettes     Smokeless tobacco: Never Used   Substance Use Topics     Alcohol use: No         Chemical History:     Tobacco: 2/day      Uses 1 caffeine per day6           Family History:     Family History   Problem Relation Age of Onset     Hypertension Father      Thyroid Disease Father      Cancer Paternal Grandmother      Cancer Maternal Grandfather                Review of Systems:     A complete 10 point review of systems was negative other than HPI or as commented below:   Dyspnea with exertion without wheezing or coughing  Occasional nausea and vomiting constipation and intermittent abdominal pain.         Physical Examination:   /65   Pulse 73   Resp 16   Ht 1.626 m (5' 4.02\")   Wt 67.1 kg (148 lb)   SpO2 98%   BMI 25.39 kg/m     Neck Circumference:  inches/cm "   Constitutional: . Awake, alert, cooperative, dressed casually, good eye contact, comfortably sitting in a chair, in no apparent distress  Mood: euthymic; affect congruent with full range and intensity.  Attention/Concentration:  Normal   Eyes: No icterus.  ENT: Normal mandibular profile Mallampati I   Retrognathia, micrognathia, small and crowded oropharynx,  low-lying soft palate macroglossia, tonsillar hypertrophy, elongated uvula, turbinate hypertrophy, deviated nasal septum, poor dentition  Cardiovascular: Regular S1 and S2, no gallops or murmurs. No carotid bruits  Neck: Supple, no thyroid enlargement.   Pulmonary:  Chest symmetric, lungs clear bilaterally and no crackles, wheezes or rales  Extremities:  No pedal edema.  Muscle/joint: Strength and tone normal   Skin:  No rash or significant lesions.   Gait Normal.  Neurologic: Alert, oriented x3, no focal neurological deficit, cranial nerves grossly normal            Data: All pertinent previous laboratory data reviewed     No results found for: PH, PHARTERIAL, PO2, SE6DICOSOXZ, SAT, PCO2, HCO3, BASEEXCESS, FARZANA, BEB  No results found for: TSH  Lab Results   Component Value Date    GLC 83 10/23/2018     Lab Results   Component Value Date    HGB 14.4 10/23/2018    HGB 9.0 (L) 09/11/2014     Lab Results   Component Value Date    BUN 9 10/23/2018    CR 0.78 10/23/2018    CR 0.68 09/11/2014     Lab Results   Component Value Date    AST 25 10/23/2018    AST 31 09/11/2014    ALT 29 10/23/2018    ALKPHOS 73 10/23/2018    BILITOTAL 0.6 10/23/2018     No results found for: UAMP, UBARB, BENZODIAZEUR, UCANN, UCOC, OPIT, UPCP      Copy to: [place], [facility] CONRAD IBER, MD 9/12/2019     Total time spent with patient: 60 min >50% counseling

## 2019-09-23 NOTE — NURSING NOTE
Mailed out copy of note to requested provider    Serina Anderson [facility]

## 2020-06-11 ENCOUNTER — NURSE TRIAGE (OUTPATIENT)
Dept: NURSING | Facility: CLINIC | Age: 35
End: 2020-06-11

## 2020-06-12 NOTE — TELEPHONE ENCOUNTER
Patient is calling for an appt.Is a non [facility] pt, states she goes to [facility]. Advised she call her PCP in am 6/12 for appt.  Calli Seth RN [facility]

## 2021-02-01 ENCOUNTER — HOSPITAL ENCOUNTER (EMERGENCY)
Facility: CLINIC | Age: 36
Discharge: HOME | End: 2021-02-01
Attending: EMERGENCY MEDICINE | Admitting: EMERGENCY MEDICINE
Payer: COMMERCIAL

## 2021-02-01 VITALS
HEART RATE: 106 BPM | TEMPERATURE: 98 F | RESPIRATION RATE: 10 BRPM | SYSTOLIC BLOOD PRESSURE: 132 MMHG | OXYGEN SATURATION: 99 % | DIASTOLIC BLOOD PRESSURE: 95 MMHG

## 2021-02-01 DIAGNOSIS — F15.10 METHAMPHETAMINE ABUSE (H): ICD-10-CM

## 2021-02-01 DIAGNOSIS — T40.1X1A ACCIDENTAL OVERDOSE OF HEROIN, INITIAL ENCOUNTER (H): ICD-10-CM

## 2021-02-01 LAB — INTERPRETATION ECG - MUSE: NORMAL

## 2021-02-01 PROCEDURE — 99283 EMERGENCY DEPT VISIT LOW MDM: CPT

## 2021-02-01 NOTE — ED NOTES
Bed: [facility]  Expected date:   Expected time:   Means of arrival:   Comments:  E2  35F Heroin/Alert  1551

## 2021-02-01 NOTE — ED TRIAGE NOTES
Pt Overdose on Heroin.  Narcan given on scene and en route.  4mg Nasal and 4mg IV .  Pt awake but drowsy

## 2021-02-01 NOTE — ED NOTES
Pt ate turkey sandwich and given winter jacket, hat and shoes also bus token. Pt AAOX3 , ambulatory

## 2021-02-01 NOTE — ED PROVIDER NOTES
History   Chief Complaint  Drug Overdose    HPI   Wendy Fisher is a 35 year old female with a history of drug abuse and anxiety who presents for evaluation following a drug overdose. The patient reports that she thought she was using cocaine this evening; however, the substance ended up being Heroin. One of the people she was with eventually lost consciousness as a result of this, prompting her to call 911. On EMS arrival, the patient was also unconscious. She received 4 mg of nasal and 2 mg of IV narcan. Here in the ED, the patient denies any complaints. She denies any SI. Of note, the patient has been to treatment twice. She reports that she last used Meth, her drug of choice, two days ago.     Review of Systems   Psychiatric/Behavioral: Negative for suicidal ideas.   All other systems reviewed and are negative.    Allergies  The patient has no known allergies.     Medications  Wellbutrin  Prozac  Synthroid  Levothyroxine  Provigil    Past Medical History  Anxiety  Anemia in pregnancy  Drug abuse    Past Surgical History      Family History  Hypertension  Thyroid disease    Social History  Presents to the ED alone, via EMS.    Physical Exam     Patient Vitals for the past 24 hrs:   BP Temp Temp src Pulse Resp SpO2   21 1000 (!) 132/95 -- -- 106 10 99 %   21 0900 123/87 -- -- 87 (!) 7 96 %   21 0807 -- -- -- 95 10 96 %   21 0806 -- -- -- 95 10 96 %   21 0805 -- -- -- 88 10 96 %   21 0803 -- -- -- 93 10 96 %   21 0802 -- -- -- 98 8 96 %   21 0801 -- -- -- 95 12 95 %   21 0800 -- -- -- 94 10 96 %   21 0759 -- -- -- 95 10 96 %   21 0758 -- -- -- 96 10 96 %   21 0757 -- -- -- 96 10 95 %   21 0756 -- -- -- 96 9 95 %   21 0754 -- -- -- 95 10 96 %   21 0753 -- -- -- 86 13 96 %   21 -- -- -- 95 12 96 %   21 -- -- -- 97 10 95 %   21 -- -- -- 99 11 95 %   21 0749 -- -- -- 96 10 96 %    02/01/21 0748 -- -- -- 96 10 96 %   02/01/21 0747 -- -- -- 100 12 96 %   02/01/21 0746 -- -- -- 94 10 96 %   02/01/21 0745 -- -- -- 98 10 96 %   02/01/21 0744 -- -- -- 96 10 96 %   02/01/21 0743 -- -- -- 97 9 95 %   02/01/21 0742 -- -- -- 98 10 95 %   02/01/21 0741 -- -- -- 99 10 96 %   02/01/21 0740 -- -- -- 99 (!) 6 96 %   02/01/21 0739 -- -- -- 99 10 96 %   02/01/21 0738 -- -- -- 99 (!) 6 96 %   02/01/21 0737 -- -- -- 102 10 96 %   02/01/21 0736 -- -- -- 98 9 96 %   02/01/21 0735 -- -- -- 98 13 96 %   02/01/21 0734 -- -- -- 99 9 96 %   02/01/21 0733 -- -- -- 97 11 95 %   02/01/21 0732 -- -- -- 100 10 96 %   02/01/21 0730 -- -- -- 96 10 96 %   02/01/21 0729 -- -- -- 101 10 95 %   02/01/21 0728 -- -- -- 105 10 96 %   02/01/21 0727 -- -- -- 102 10 96 %   02/01/21 0726 -- -- -- 101 11 95 %   02/01/21 0725 -- -- -- 100 10 95 %   02/01/21 0724 -- -- -- 101 10 95 %   02/01/21 0723 -- -- -- 97 12 96 %   02/01/21 0722 -- -- -- 101 12 96 %   02/01/21 0721 -- -- -- 102 9 96 %   02/01/21 0720 -- -- -- 102 9 96 %   02/01/21 0719 -- -- -- 98 10 96 %   02/01/21 0718 -- -- -- 102 9 95 %   02/01/21 0717 -- -- -- 101 8 96 %   02/01/21 0716 -- -- -- 102 10 95 %   02/01/21 0715 -- -- -- 100 11 95 %   02/01/21 0714 -- -- -- 100 11 95 %   02/01/21 0713 -- -- -- 97 14 96 %   02/01/21 0712 -- -- -- 98 10 94 %   02/01/21 0711 -- -- -- 97 10 96 %   02/01/21 0710 -- -- -- 93 13 98 %   02/01/21 0709 -- -- -- 93 9 98 %   02/01/21 0708 -- -- -- 95 14 96 %   02/01/21 0707 -- -- -- 107 9 99 %   02/01/21 0706 -- -- -- 107 (!) 45 --   02/01/21 0701 -- -- -- -- -- 97 %   02/01/21 0700 -- -- -- -- -- 98 %   02/01/21 0659 -- -- -- -- -- 98 %   02/01/21 0658 -- -- -- -- -- 99 %   02/01/21 0656 -- -- -- -- -- 99 %   02/01/21 0654 -- -- -- 112 12 --   02/01/21 0653 -- -- -- 110 13 --   02/01/21 0652 -- -- -- 117 14 --   02/01/21 0651 -- -- -- 111 (!) 0 --   02/01/21 0650 -- -- -- 114 10 --   02/01/21 0643 -- -- -- -- -- 96 %   02/01/21 0642 -- -- --  -- -- 96 %   02/01/21 0641 -- -- -- -- -- 95 %   02/01/21 0640 -- -- -- -- -- 96 %   02/01/21 0639 -- -- -- -- -- 96 %   02/01/21 0638 -- -- -- -- -- 96 %   02/01/21 0637 -- -- -- -- -- 96 %   02/01/21 0636 -- -- -- -- -- 95 %   02/01/21 0635 -- -- -- -- -- 95 %   02/01/21 0634 -- -- -- -- -- 95 %   02/01/21 0633 -- -- -- -- -- 95 %   02/01/21 0632 -- -- -- -- -- 94 %   02/01/21 0631 -- -- -- -- -- 94 %   02/01/21 0630 -- -- -- -- -- 94 %   02/01/21 0629 -- -- -- -- -- 96 %   02/01/21 0628 -- -- -- -- -- 94 %   02/01/21 0627 -- -- -- -- -- 93 %   02/01/21 0626 -- -- -- -- -- 92 %   02/01/21 0625 -- -- -- -- -- 92 %   02/01/21 0624 -- -- -- -- -- 92 %   02/01/21 0623 -- -- -- -- -- 93 %   02/01/21 0622 -- -- -- -- -- 93 %   02/01/21 0621 -- -- -- -- -- 94 %   02/01/21 0620 -- -- -- -- -- 98 %   02/01/21 0619 -- -- -- -- -- 98 %   02/01/21 0618 -- -- -- -- -- 100 %   02/01/21 0617 -- -- -- -- -- 100 %   02/01/21 0616 -- -- -- -- -- 100 %   02/01/21 0615 -- -- -- -- -- 100 %   02/01/21 0614 -- -- -- 109 16 100 %   02/01/21 0613 -- -- -- 116 16 100 %   02/01/21 0612 -- -- -- 113 13 100 %   02/01/21 0611 -- -- -- 114 11 100 %   02/01/21 0610 -- -- -- 118 16 100 %   02/01/21 0600 (!) 142/99 98  F (36.7  C) Oral 116 -- 100 %     Physical Exam  GENERAL: well developed, pleasant  HEAD: atraumatic  EYES: pupils reactive, extraocular muscles intact, conjunctivae normal  ENT:  mucus membranes moist  NECK:  trachea midline, normal range of motion  RESPIRATORY: no tachypnea, breath sounds clear to auscultation   CVS: normal S1/S2, no murmurs, intact distal pulses  ABDOMEN: soft, nontender, nondistention  MUSCULOSKELETAL: no deformities  SKIN: warm and dry, multiple track marks on both arms   NEURO: GCS 15, cranial nerves intact, alert and oriented x3  PSYCH:  Mood/affect normal    Emergency Department Course   Emergency Department Course:  Reviewed:  I reviewed nursing notes, vitals and past medical  history    Assessments:  0653 I physically examined the patient as documented above.    1042 I rechecked the patient.     Disposition:  The patient was discharged to home.     Impression & Plan   Medical Decision Making:    Presents with accidental drug overdose.  She typically uses methamphetamines.  That was using cocaine but was heroin.  She became unresponsive but had called 911 prior to this.  Patient has multiple track marks.  Patient received Narcan prior to arrival.  She is watched here for period of time and did not require further Narcan dosing.  Discussed with her getting in for treatment and avoiding drugs.  Patient was given food.  She struggling to get a ride so was given a bus token.    Diagnosis:    ICD-10-CM    1. Accidental overdose of heroin, initial encounter (H)  T40.1X1A    2. Methamphetamine abuse (H)  F15.10      Discharge Medications:  New Prescriptions    NALOXONE (EVZIO) 0.4 MG/0.4ML AUTO-INJECTOR    Inject 0.4 mLs (0.4 mg) into the muscle as needed for opioid reversal every 2-3 minutes until assistance arrives       Scribe Disclosure:  I, David Dwyer, am serving as a scribe at 7:20 AM on 2/1/2021 to document services personally performed by Quincy Saleh MD based on my observations and the provider's statements to me.      Quincy Saleh MD  02/01/21 [id number]

## 2021-02-06 ENCOUNTER — HOSPITAL ENCOUNTER (INPATIENT)
Facility: CLINIC | Age: 36
LOS: 4 days | Discharge: HOME | DRG: 917 | End: 2021-02-10
Attending: EMERGENCY MEDICINE | Admitting: INTERNAL MEDICINE
Payer: COMMERCIAL

## 2021-02-06 ENCOUNTER — APPOINTMENT (OUTPATIENT)
Dept: GENERAL RADIOLOGY | Facility: CLINIC | Age: 36
DRG: 917 | End: 2021-02-06
Attending: EMERGENCY MEDICINE
Payer: COMMERCIAL

## 2021-02-06 DIAGNOSIS — R09.2 RESPIRATORY ARREST (H): ICD-10-CM

## 2021-02-06 DIAGNOSIS — E03.9 HYPOTHYROIDISM, UNSPECIFIED TYPE: ICD-10-CM

## 2021-02-06 DIAGNOSIS — F11.20 SEVERE HEROIN DEPENDENCE (H): Primary | ICD-10-CM

## 2021-02-06 DIAGNOSIS — T40.601A OPIATE OVERDOSE, ACCIDENTAL OR UNINTENTIONAL, INITIAL ENCOUNTER (H): ICD-10-CM

## 2021-02-06 DIAGNOSIS — E87.29 RESPIRATORY ACIDOSIS: ICD-10-CM

## 2021-02-06 DIAGNOSIS — F11.23 OPIOID DEPENDENCE WITH WITHDRAWAL (H): ICD-10-CM

## 2021-02-06 DIAGNOSIS — T68.XXXA HYPOTHERMIA, INITIAL ENCOUNTER: ICD-10-CM

## 2021-02-06 DIAGNOSIS — N30.00 ACUTE CYSTITIS WITHOUT HEMATURIA: ICD-10-CM

## 2021-02-06 PROBLEM — F15.10: Status: ACTIVE | Noted: 2021-02-06

## 2021-02-06 PROBLEM — F19.90 ACTIVE INTRAVENOUS DRUG USE: Status: ACTIVE | Noted: 2021-02-06

## 2021-02-06 PROBLEM — T50.902A SUICIDE ATTEMPT BY DRUG OVERDOSE (H): Status: ACTIVE | Noted: 2021-02-06

## 2021-02-06 LAB
ALBUMIN SERPL-MCNC: 3.8 G/DL (ref 3.4–5)
ALBUMIN UR-MCNC: NEGATIVE MG/DL
ALP SERPL-CCNC: 211 U/L (ref 40–150)
ALT SERPL W P-5'-P-CCNC: 175 U/L (ref 0–50)
ANION GAP SERPL CALCULATED.3IONS-SCNC: 9 MMOL/L (ref 3–14)
APAP SERPL-MCNC: 2 MG/L (ref 10–20)
APPEARANCE UR: CLEAR
AST SERPL W P-5'-P-CCNC: 326 U/L (ref 0–45)
BACTERIA #/AREA URNS HPF: ABNORMAL /HPF
BASE DEFICIT BLDV-SCNC: 2.5 MMOL/L
BASE EXCESS BLDV CALC-SCNC: 2.6 MMOL/L
BASOPHILS # BLD AUTO: 0.1 10E9/L (ref 0–0.2)
BASOPHILS NFR BLD AUTO: 0.3 %
BILIRUB SERPL-MCNC: 0.3 MG/DL (ref 0.2–1.3)
BILIRUB UR QL STRIP: NEGATIVE
BUN SERPL-MCNC: 12 MG/DL (ref 7–30)
CALCIUM SERPL-MCNC: 9.2 MG/DL (ref 8.5–10.1)
CHLORIDE SERPL-SCNC: 105 MMOL/L (ref 94–109)
CK SERPL-CCNC: 118 U/L (ref 30–225)
CO2 SERPL-SCNC: 28 MMOL/L (ref 20–32)
COLOR UR AUTO: ABNORMAL
CREAT SERPL-MCNC: 1.13 MG/DL (ref 0.52–1.04)
DIFFERENTIAL METHOD BLD: ABNORMAL
EOSINOPHIL # BLD AUTO: 0.1 10E9/L (ref 0–0.7)
EOSINOPHIL NFR BLD AUTO: 0.4 %
ERYTHROCYTE [DISTWIDTH] IN BLOOD BY AUTOMATED COUNT: 11.9 % (ref 10–15)
ETHANOL SERPL-MCNC: <0.01 G/DL
FLUAV RNA RESP QL NAA+PROBE: NEGATIVE
FLUBV RNA RESP QL NAA+PROBE: NEGATIVE
GFR SERPL CREATININE-BSD FRML MDRD: 62 ML/MIN/{1.73_M2}
GLUCOSE SERPL-MCNC: 142 MG/DL (ref 70–99)
GLUCOSE UR STRIP-MCNC: 30 MG/DL
HCO3 BLDV-SCNC: 29 MMOL/L (ref 21–28)
HCO3 BLDV-SCNC: 30 MMOL/L (ref 21–28)
HCT VFR BLD AUTO: 48.9 % (ref 35–47)
HGB BLD-MCNC: 15.7 G/DL (ref 11.7–15.7)
HGB UR QL STRIP: NEGATIVE
IMM GRANULOCYTES # BLD: 0.5 10E9/L (ref 0–0.4)
IMM GRANULOCYTES NFR BLD: 2.4 %
INR PPP: 0.97 (ref 0.86–1.14)
INTERPRETATION ECG - MUSE: NORMAL
KETONES UR STRIP-MCNC: NEGATIVE MG/DL
LABORATORY COMMENT REPORT: NORMAL
LACTATE BLD-SCNC: 0.9 MMOL/L (ref 0.7–2)
LACTATE BLD-SCNC: 7.6 MMOL/L (ref 0.7–2)
LEUKOCYTE ESTERASE UR QL STRIP: ABNORMAL
LYMPHOCYTES # BLD AUTO: 1.7 10E9/L (ref 0.8–5.3)
LYMPHOCYTES NFR BLD AUTO: 8.8 %
MCH RBC QN AUTO: 30.5 PG (ref 26.5–33)
MCHC RBC AUTO-ENTMCNC: 32.1 G/DL (ref 31.5–36.5)
MCV RBC AUTO: 95 FL (ref 78–100)
MONOCYTES # BLD AUTO: 1.1 10E9/L (ref 0–1.3)
MONOCYTES NFR BLD AUTO: 5.6 %
NEUTROPHILS # BLD AUTO: 15.7 10E9/L (ref 1.6–8.3)
NEUTROPHILS NFR BLD AUTO: 82.5 %
NITRATE UR QL: POSITIVE
NRBC # BLD AUTO: 0 10*3/UL
NRBC BLD AUTO-RTO: 0 /100
O2/TOTAL GAS SETTING VFR VENT: 0 %
O2/TOTAL GAS SETTING VFR VENT: ABNORMAL %
PCO2 BLDV: 53 MM HG (ref 40–50)
PCO2 BLDV: 92 MM HG (ref 40–50)
PH BLDV: 7.13 PH (ref 7.32–7.43)
PH BLDV: 7.35 PH (ref 7.32–7.43)
PH UR STRIP: 6 PH (ref 5–7)
PLATELET # BLD AUTO: 248 10E9/L (ref 150–450)
PO2 BLDV: 61 MM HG (ref 25–47)
PO2 BLDV: 9 MM HG (ref 25–47)
POTASSIUM SERPL-SCNC: 4.8 MMOL/L (ref 3.4–5.3)
PROT SERPL-MCNC: 8.2 G/DL (ref 6.8–8.8)
RBC # BLD AUTO: 5.15 10E12/L (ref 3.8–5.2)
RBC #/AREA URNS AUTO: 1 /HPF (ref 0–2)
RSV RNA SPEC QL NAA+PROBE: NORMAL
SALICYLATES SERPL-MCNC: <2 MG/DL
SARS-COV-2 RNA RESP QL NAA+PROBE: NEGATIVE
SODIUM SERPL-SCNC: 142 MMOL/L (ref 133–144)
SOURCE: ABNORMAL
SP GR UR STRIP: 1.01 (ref 1–1.03)
SPECIMEN SOURCE: NORMAL
SQUAMOUS #/AREA URNS AUTO: <1 /HPF (ref 0–1)
TRANS CELLS #/AREA URNS HPF: <1 /HPF (ref 0–1)
UROBILINOGEN UR STRIP-MCNC: 0 MG/DL (ref 0–2)
WBC # BLD AUTO: 19 10E9/L (ref 4–11)
WBC #/AREA URNS AUTO: 5 /HPF (ref 0–5)

## 2021-02-06 PROCEDURE — 99223 1ST HOSP IP/OBS HIGH 75: CPT | Mod: AI | Performed by: INTERNAL MEDICINE

## 2021-02-06 PROCEDURE — 87088 URINE BACTERIA CULTURE: CPT | Performed by: EMERGENCY MEDICINE

## 2021-02-06 PROCEDURE — 71045 X-RAY EXAM CHEST 1 VIEW: CPT

## 2021-02-06 PROCEDURE — 93005 ELECTROCARDIOGRAM TRACING: CPT | Mod: 76

## 2021-02-06 PROCEDURE — 85025 COMPLETE CBC W/AUTO DIFF WBC: CPT | Performed by: EMERGENCY MEDICINE

## 2021-02-06 PROCEDURE — C9803 HOPD COVID-19 SPEC COLLECT: HCPCS

## 2021-02-06 PROCEDURE — 87086 URINE CULTURE/COLONY COUNT: CPT | Performed by: EMERGENCY MEDICINE

## 2021-02-06 PROCEDURE — 210N000002 HC R&B HEART CARE

## 2021-02-06 PROCEDURE — 93005 ELECTROCARDIOGRAM TRACING: CPT

## 2021-02-06 PROCEDURE — 87186 SC STD MICRODIL/AGAR DIL: CPT | Performed by: EMERGENCY MEDICINE

## 2021-02-06 PROCEDURE — 250N000013 HC RX MED GY IP 250 OP 250 PS 637: Performed by: EMERGENCY MEDICINE

## 2021-02-06 PROCEDURE — 81001 URINALYSIS AUTO W/SCOPE: CPT | Performed by: EMERGENCY MEDICINE

## 2021-02-06 PROCEDURE — 258N000003 HC RX IP 258 OP 636: Performed by: EMERGENCY MEDICINE

## 2021-02-06 PROCEDURE — 99292 CRITICAL CARE ADDL 30 MIN: CPT

## 2021-02-06 PROCEDURE — 87636 SARSCOV2 & INF A&B AMP PRB: CPT | Performed by: EMERGENCY MEDICINE

## 2021-02-06 PROCEDURE — 82077 ASSAY SPEC XCP UR&BREATH IA: CPT | Performed by: EMERGENCY MEDICINE

## 2021-02-06 PROCEDURE — 87040 BLOOD CULTURE FOR BACTERIA: CPT | Performed by: EMERGENCY MEDICINE

## 2021-02-06 PROCEDURE — 80053 COMPREHEN METABOLIC PANEL: CPT | Performed by: EMERGENCY MEDICINE

## 2021-02-06 PROCEDURE — 83605 ASSAY OF LACTIC ACID: CPT | Performed by: EMERGENCY MEDICINE

## 2021-02-06 PROCEDURE — 96361 HYDRATE IV INFUSION ADD-ON: CPT

## 2021-02-06 PROCEDURE — 99291 CRITICAL CARE FIRST HOUR: CPT

## 2021-02-06 PROCEDURE — 82550 ASSAY OF CK (CPK): CPT | Performed by: EMERGENCY MEDICINE

## 2021-02-06 PROCEDURE — 80143 DRUG ASSAY ACETAMINOPHEN: CPT | Performed by: EMERGENCY MEDICINE

## 2021-02-06 PROCEDURE — 80179 DRUG ASSAY SALICYLATE: CPT | Performed by: EMERGENCY MEDICINE

## 2021-02-06 PROCEDURE — 85610 PROTHROMBIN TIME: CPT | Performed by: EMERGENCY MEDICINE

## 2021-02-06 PROCEDURE — 82803 BLOOD GASES ANY COMBINATION: CPT | Performed by: EMERGENCY MEDICINE

## 2021-02-06 PROCEDURE — 250N000011 HC RX IP 250 OP 636: Performed by: EMERGENCY MEDICINE

## 2021-02-06 PROCEDURE — 96374 THER/PROPH/DIAG INJ IV PUSH: CPT

## 2021-02-06 RX ORDER — LORAZEPAM 2 MG/ML
0.5 INJECTION INTRAMUSCULAR EVERY 10 MIN PRN
Status: COMPLETED | OUTPATIENT
Start: 2021-02-06 | End: 2021-02-06

## 2021-02-06 RX ORDER — SODIUM CHLORIDE 9 MG/ML
INJECTION, SOLUTION INTRAVENOUS CONTINUOUS
Status: DISCONTINUED | OUTPATIENT
Start: 2021-02-06 | End: 2021-02-06

## 2021-02-06 RX ORDER — CEFTRIAXONE 1 G/1
1 INJECTION, POWDER, FOR SOLUTION INTRAMUSCULAR; INTRAVENOUS ONCE
Status: COMPLETED | OUTPATIENT
Start: 2021-02-06 | End: 2021-02-06

## 2021-02-06 RX ORDER — SODIUM CHLORIDE 9 MG/ML
INJECTION, SOLUTION INTRAVENOUS CONTINUOUS
Status: DISCONTINUED | OUTPATIENT
Start: 2021-02-06 | End: 2021-02-07

## 2021-02-06 RX ORDER — NALOXONE HYDROCHLORIDE 0.4 MG/ML
0.4 INJECTION, SOLUTION INTRAMUSCULAR; INTRAVENOUS; SUBCUTANEOUS ONCE
Status: DISCONTINUED | OUTPATIENT
Start: 2021-02-06 | End: 2021-02-07

## 2021-02-06 RX ORDER — NALOXONE HYDROCHLORIDE 1 MG/ML
1 INJECTION INTRAMUSCULAR; INTRAVENOUS; SUBCUTANEOUS ONCE
Status: COMPLETED | OUTPATIENT
Start: 2021-02-06 | End: 2021-02-06

## 2021-02-06 RX ADMIN — SODIUM CHLORIDE 1770 ML: 9 INJECTION, SOLUTION INTRAVENOUS at 21:15

## 2021-02-06 RX ADMIN — LORAZEPAM 0.5 MG: 2 INJECTION INTRAMUSCULAR; INTRAVENOUS at 19:25

## 2021-02-06 RX ADMIN — CEFTRIAXONE 1 G: 1 INJECTION, POWDER, FOR SOLUTION INTRAMUSCULAR; INTRAVENOUS at 23:38

## 2021-02-06 RX ADMIN — NALOXONE HYDROCHLORIDE 4 MG: 4 SPRAY NASAL at 18:21

## 2021-02-06 RX ADMIN — LORAZEPAM 0.5 MG: 2 INJECTION INTRAMUSCULAR; INTRAVENOUS at 19:37

## 2021-02-06 RX ADMIN — SODIUM CHLORIDE 1000 ML: 9 INJECTION, SOLUTION INTRAVENOUS at 18:43

## 2021-02-06 RX ADMIN — NALOXONE HYDROCHLORIDE 2 MG: 1 INJECTION PARENTERAL at 18:18

## 2021-02-06 RX ADMIN — LORAZEPAM 0.5 MG: 2 INJECTION INTRAMUSCULAR; INTRAVENOUS at 19:47

## 2021-02-07 LAB
ANION GAP SERPL CALCULATED.3IONS-SCNC: 4 MMOL/L (ref 3–14)
BUN SERPL-MCNC: 14 MG/DL (ref 7–30)
CALCIUM SERPL-MCNC: 8 MG/DL (ref 8.5–10.1)
CHLORIDE SERPL-SCNC: 112 MMOL/L (ref 94–109)
CK SERPL-CCNC: 87 U/L (ref 30–225)
CO2 SERPL-SCNC: 26 MMOL/L (ref 20–32)
CREAT SERPL-MCNC: 0.77 MG/DL (ref 0.52–1.04)
ERYTHROCYTE [DISTWIDTH] IN BLOOD BY AUTOMATED COUNT: 12.3 % (ref 10–15)
GFR SERPL CREATININE-BSD FRML MDRD: >90 ML/MIN/{1.73_M2}
GLUCOSE BLDC GLUCOMTR-MCNC: 162 MG/DL (ref 70–99)
GLUCOSE SERPL-MCNC: 72 MG/DL (ref 70–99)
HCG SERPL QL: NEGATIVE
HCT VFR BLD AUTO: 35.7 % (ref 35–47)
HGB BLD-MCNC: 11.9 G/DL (ref 11.7–15.7)
MCH RBC QN AUTO: 30.7 PG (ref 26.5–33)
MCHC RBC AUTO-ENTMCNC: 33.3 G/DL (ref 31.5–36.5)
MCV RBC AUTO: 92 FL (ref 78–100)
PLATELET # BLD AUTO: 187 10E9/L (ref 150–450)
POTASSIUM SERPL-SCNC: 4 MMOL/L (ref 3.4–5.3)
PROCALCITONIN SERPL-MCNC: 1 NG/ML
RBC # BLD AUTO: 3.88 10E12/L (ref 3.8–5.2)
SODIUM SERPL-SCNC: 142 MMOL/L (ref 133–144)
WBC # BLD AUTO: 13.7 10E9/L (ref 4–11)

## 2021-02-07 PROCEDURE — 36415 COLL VENOUS BLD VENIPUNCTURE: CPT | Performed by: INTERNAL MEDICINE

## 2021-02-07 PROCEDURE — 99207 PR CDG-CODE CATEGORY CHANGED: CPT | Performed by: PSYCHIATRY & NEUROLOGY

## 2021-02-07 PROCEDURE — 99233 SBSQ HOSP IP/OBS HIGH 50: CPT | Performed by: INTERNAL MEDICINE

## 2021-02-07 PROCEDURE — 258N000001 HC RX 258: Performed by: INTERNAL MEDICINE

## 2021-02-07 PROCEDURE — 999N001017 HC STATISTIC GLUCOSE BY METER IP

## 2021-02-07 PROCEDURE — 85027 COMPLETE CBC AUTOMATED: CPT | Performed by: INTERNAL MEDICINE

## 2021-02-07 PROCEDURE — 84145 PROCALCITONIN (PCT): CPT | Performed by: INTERNAL MEDICINE

## 2021-02-07 PROCEDURE — 80048 BASIC METABOLIC PNL TOTAL CA: CPT | Performed by: INTERNAL MEDICINE

## 2021-02-07 PROCEDURE — 250N000011 HC RX IP 250 OP 636: Performed by: INTERNAL MEDICINE

## 2021-02-07 PROCEDURE — 250N000013 HC RX MED GY IP 250 OP 250 PS 637: Performed by: PHYSICIAN ASSISTANT

## 2021-02-07 PROCEDURE — 82550 ASSAY OF CK (CPK): CPT | Performed by: INTERNAL MEDICINE

## 2021-02-07 PROCEDURE — 210N000002 HC R&B HEART CARE

## 2021-02-07 PROCEDURE — 99221 1ST HOSP IP/OBS SF/LOW 40: CPT | Mod: GT | Performed by: PSYCHIATRY & NEUROLOGY

## 2021-02-07 PROCEDURE — 210N000001 HC R&B IMCU HEART CARE

## 2021-02-07 PROCEDURE — 250N000013 HC RX MED GY IP 250 OP 250 PS 637: Performed by: INTERNAL MEDICINE

## 2021-02-07 PROCEDURE — 258N000003 HC RX IP 258 OP 636: Performed by: INTERNAL MEDICINE

## 2021-02-07 PROCEDURE — 84703 CHORIONIC GONADOTROPIN ASSAY: CPT | Performed by: INTERNAL MEDICINE

## 2021-02-07 RX ORDER — DEXTROSE MONOHYDRATE, SODIUM CHLORIDE, AND POTASSIUM CHLORIDE 50; 1.49; 9 G/1000ML; G/1000ML; G/1000ML
INJECTION, SOLUTION INTRAVENOUS CONTINUOUS
Status: DISCONTINUED | OUTPATIENT
Start: 2021-02-07 | End: 2021-02-08

## 2021-02-07 RX ORDER — DEXTROAMPHETAMINE SACCHARATE, AMPHETAMINE ASPARTATE, DEXTROAMPHETAMINE SULFATE AND AMPHETAMINE SULFATE 5; 5; 5; 5 MG/1; MG/1; MG/1; MG/1
20 TABLET ORAL DAILY
Status: ON HOLD | COMMUNITY
End: 2021-02-09

## 2021-02-07 RX ORDER — AMOXICILLIN 250 MG
1 CAPSULE ORAL 2 TIMES DAILY PRN
Status: DISCONTINUED | OUTPATIENT
Start: 2021-02-07 | End: 2021-02-10 | Stop reason: HOSPADM

## 2021-02-07 RX ORDER — NALOXONE HYDROCHLORIDE 0.4 MG/ML
0.4 INJECTION, SOLUTION INTRAMUSCULAR; INTRAVENOUS; SUBCUTANEOUS
Status: DISCONTINUED | OUTPATIENT
Start: 2021-02-07 | End: 2021-02-10 | Stop reason: HOSPADM

## 2021-02-07 RX ORDER — PROCHLORPERAZINE 25 MG
25 SUPPOSITORY, RECTAL RECTAL EVERY 12 HOURS PRN
Status: DISCONTINUED | OUTPATIENT
Start: 2021-02-07 | End: 2021-02-10 | Stop reason: HOSPADM

## 2021-02-07 RX ORDER — POLYETHYLENE GLYCOL 3350 17 G/17G
17 POWDER, FOR SOLUTION ORAL DAILY PRN
Status: DISCONTINUED | OUTPATIENT
Start: 2021-02-07 | End: 2021-02-10 | Stop reason: HOSPADM

## 2021-02-07 RX ORDER — ONDANSETRON 2 MG/ML
4 INJECTION INTRAMUSCULAR; INTRAVENOUS EVERY 6 HOURS PRN
Status: DISCONTINUED | OUTPATIENT
Start: 2021-02-07 | End: 2021-02-10 | Stop reason: HOSPADM

## 2021-02-07 RX ORDER — BUPRENORPHINE AND NALOXONE 2; .5 MG/1; MG/1
1 FILM, SOLUBLE BUCCAL; SUBLINGUAL DAILY
Status: ON HOLD | COMMUNITY
End: 2021-02-09

## 2021-02-07 RX ORDER — NITROGLYCERIN 0.4 MG/1
0.4 TABLET SUBLINGUAL EVERY 5 MIN PRN
Status: DISCONTINUED | OUTPATIENT
Start: 2021-02-07 | End: 2021-02-10 | Stop reason: HOSPADM

## 2021-02-07 RX ORDER — LIDOCAINE 40 MG/G
CREAM TOPICAL
Status: DISCONTINUED | OUTPATIENT
Start: 2021-02-07 | End: 2021-02-10 | Stop reason: HOSPADM

## 2021-02-07 RX ORDER — CLONIDINE HYDROCHLORIDE 0.1 MG/1
0.1 TABLET ORAL 3 TIMES DAILY PRN
Status: ON HOLD | COMMUNITY
End: 2021-02-10

## 2021-02-07 RX ORDER — LIDOCAINE 40 MG/G
CREAM TOPICAL
Status: DISCONTINUED | OUTPATIENT
Start: 2021-02-07 | End: 2021-02-07

## 2021-02-07 RX ORDER — CLONIDINE HYDROCHLORIDE 0.1 MG/1
0.1 TABLET ORAL ONCE
Status: COMPLETED | OUTPATIENT
Start: 2021-02-07 | End: 2021-02-07

## 2021-02-07 RX ORDER — FLUOXETINE 40 MG/1
40 CAPSULE ORAL DAILY
Status: ON HOLD | COMMUNITY
End: 2021-02-09

## 2021-02-07 RX ORDER — NALOXONE HYDROCHLORIDE 0.4 MG/ML
0.2 INJECTION, SOLUTION INTRAMUSCULAR; INTRAVENOUS; SUBCUTANEOUS
Status: DISCONTINUED | OUTPATIENT
Start: 2021-02-07 | End: 2021-02-10 | Stop reason: HOSPADM

## 2021-02-07 RX ORDER — BISACODYL 10 MG
10 SUPPOSITORY, RECTAL RECTAL DAILY PRN
Status: DISCONTINUED | OUTPATIENT
Start: 2021-02-07 | End: 2021-02-10 | Stop reason: HOSPADM

## 2021-02-07 RX ORDER — ACETAMINOPHEN 650 MG/1
650 SUPPOSITORY RECTAL EVERY 4 HOURS PRN
Status: DISCONTINUED | OUTPATIENT
Start: 2021-02-07 | End: 2021-02-10 | Stop reason: HOSPADM

## 2021-02-07 RX ORDER — MODAFINIL 100 MG/1
100 TABLET ORAL DAILY
Status: ON HOLD | COMMUNITY
End: 2021-02-09

## 2021-02-07 RX ORDER — GABAPENTIN 300 MG/1
300 CAPSULE ORAL 2 TIMES DAILY
Status: ON HOLD | COMMUNITY
End: 2021-02-10

## 2021-02-07 RX ORDER — CEFTRIAXONE 1 G/1
1 INJECTION, POWDER, FOR SOLUTION INTRAMUSCULAR; INTRAVENOUS EVERY 24 HOURS
Status: DISCONTINUED | OUTPATIENT
Start: 2021-02-07 | End: 2021-02-09

## 2021-02-07 RX ORDER — AMOXICILLIN 250 MG
2 CAPSULE ORAL 2 TIMES DAILY PRN
Status: DISCONTINUED | OUTPATIENT
Start: 2021-02-07 | End: 2021-02-10 | Stop reason: HOSPADM

## 2021-02-07 RX ORDER — NAPROXEN 500 MG/1
500 TABLET ORAL 2 TIMES DAILY PRN
Status: ON HOLD | COMMUNITY
End: 2021-02-09

## 2021-02-07 RX ORDER — BUPROPION HYDROCHLORIDE 200 MG/1
200 TABLET, EXTENDED RELEASE ORAL 2 TIMES DAILY
Status: ON HOLD | COMMUNITY
End: 2021-02-09

## 2021-02-07 RX ORDER — IBUPROFEN 600 MG/1
600 TABLET, FILM COATED ORAL EVERY 6 HOURS PRN
Status: DISCONTINUED | OUTPATIENT
Start: 2021-02-07 | End: 2021-02-10 | Stop reason: HOSPADM

## 2021-02-07 RX ORDER — PROCHLORPERAZINE MALEATE 5 MG
10 TABLET ORAL EVERY 6 HOURS PRN
Status: DISCONTINUED | OUTPATIENT
Start: 2021-02-07 | End: 2021-02-10 | Stop reason: HOSPADM

## 2021-02-07 RX ORDER — ONDANSETRON 4 MG/1
4 TABLET, ORALLY DISINTEGRATING ORAL EVERY 6 HOURS PRN
Status: DISCONTINUED | OUTPATIENT
Start: 2021-02-07 | End: 2021-02-10 | Stop reason: HOSPADM

## 2021-02-07 RX ORDER — LEVONORGESTREL/ETHIN.ESTRADIOL 0.1-0.02MG
1 TABLET ORAL DAILY
Status: ON HOLD | COMMUNITY
End: 2021-02-09

## 2021-02-07 RX ORDER — HYDRALAZINE HYDROCHLORIDE 20 MG/ML
10 INJECTION INTRAMUSCULAR; INTRAVENOUS EVERY 4 HOURS PRN
Status: DISCONTINUED | OUTPATIENT
Start: 2021-02-07 | End: 2021-02-10 | Stop reason: HOSPADM

## 2021-02-07 RX ORDER — LEVOTHYROXINE SODIUM 112 UG/1
112 TABLET ORAL DAILY
Status: ON HOLD | COMMUNITY
End: 2021-02-09

## 2021-02-07 RX ORDER — ACETAMINOPHEN 325 MG/1
650 TABLET ORAL EVERY 4 HOURS PRN
Status: DISCONTINUED | OUTPATIENT
Start: 2021-02-07 | End: 2021-02-10 | Stop reason: HOSPADM

## 2021-02-07 RX ORDER — ATOMOXETINE 40 MG/1
40 CAPSULE ORAL DAILY
Status: ON HOLD | COMMUNITY
End: 2021-02-09

## 2021-02-07 RX ORDER — SODIUM CHLORIDE 9 MG/ML
INJECTION, SOLUTION INTRAVENOUS CONTINUOUS
Status: DISCONTINUED | OUTPATIENT
Start: 2021-02-07 | End: 2021-02-07

## 2021-02-07 RX ADMIN — POTASSIUM CHLORIDE, DEXTROSE MONOHYDRATE AND SODIUM CHLORIDE: 150; 5; 900 INJECTION, SOLUTION INTRAVENOUS at 09:28

## 2021-02-07 RX ADMIN — POTASSIUM CHLORIDE, DEXTROSE MONOHYDRATE AND SODIUM CHLORIDE: 150; 5; 900 INJECTION, SOLUTION INTRAVENOUS at 21:58

## 2021-02-07 RX ADMIN — ACETAMINOPHEN 650 MG: 325 TABLET, FILM COATED ORAL at 14:36

## 2021-02-07 RX ADMIN — CLONIDINE HYDROCHLORIDE 0.1 MG: 0.1 TABLET ORAL at 20:51

## 2021-02-07 RX ADMIN — ACETAMINOPHEN 650 MG: 325 TABLET, FILM COATED ORAL at 20:51

## 2021-02-07 RX ADMIN — ACETAMINOPHEN 650 MG: 325 TABLET, FILM COATED ORAL at 03:16

## 2021-02-07 RX ADMIN — CEFTRIAXONE SODIUM 1 G: 1 INJECTION, POWDER, FOR SOLUTION INTRAMUSCULAR; INTRAVENOUS at 21:40

## 2021-02-07 RX ADMIN — ENOXAPARIN SODIUM 40 MG: 40 INJECTION SUBCUTANEOUS at 14:33

## 2021-02-07 RX ADMIN — SODIUM CHLORIDE: 9 INJECTION, SOLUTION INTRAVENOUS at 00:24

## 2021-02-07 ASSESSMENT — ACTIVITIES OF DAILY LIVING (ADL)
ADLS_ACUITY_SCORE: 22
ADLS_ACUITY_SCORE: 18
ADLS_ACUITY_SCORE: 22
ADLS_ACUITY_SCORE: 20
ADLS_ACUITY_SCORE: 20
ADLS_ACUITY_SCORE: 22

## 2021-02-07 NOTE — ED NOTES
[facility]  ED Nurse Handoff Report    ED Chief complaint: Drug Overdose      ED Diagnosis:   Final diagnoses:   Respiratory arrest (H)   Respiratory acidosis   Opiate overdose, accidental or unintentional, initial encounter (H)       Code Status: Full Code    Allergies: Not on File    Patient Story: Hx of IV drug use. Brought in by a friend in respiratory arrest after a drug overdose. Patient was cyanotic, not breathing, and pulseless. Responded to Narcan. After Narcan, patient became altered, trying to get out of bed, and pulling IV lines. 1.5 mg of Ativan given.  Focused Assessment:  Currently somnolent. In room air. Sinus Tachycardic and normotensive. Warm to touch. Track marks in both arms. 18G in R AC. CPR was not needed. Lactic acid improved as fluids were given and ventilation was appropriate.     Labs Ordered and Resulted from Time of ED Arrival Up to the Time of Departure from the ED   CBC WITH PLATELETS DIFFERENTIAL - Abnormal; Notable for the following components:       Result Value    WBC 19.0 (*)     Hematocrit 48.9 (*)     Absolute Neutrophil 15.7 (*)     Abs Immature Granulocytes 0.5 (*)     All other components within normal limits   COMPREHENSIVE METABOLIC PANEL - Abnormal; Notable for the following components:    Glucose 142 (*)     Creatinine 1.13 (*)     Alkaline Phosphatase 211 (*)      (*)      (*)     All other components within normal limits   LACTIC ACID WHOLE BLOOD - Abnormal; Notable for the following components:    Lactic Acid 7.6 (*)     All other components within normal limits   BLOOD GAS VENOUS - Abnormal; Notable for the following components:    Ph Venous 7.13 (*)     PCO2 Venous 92 (*)     PO2 Venous 9 (*)     Bicarbonate Venous 30 (*)     All other components within normal limits   BLOOD GAS VENOUS - Abnormal; Notable for the following components:    PCO2 Venous 53 (*)     PO2 Venous 61 (*)     Bicarbonate Venous 29 (*)     All other components  within normal limits   ROUTINE UA WITH MICROSCOPIC REFLEX TO CULTURE - Abnormal; Notable for the following components:    Glucose Urine 30 (*)     Nitrite Urine Positive (*)     Leukocyte Esterase Urine Moderate (*)     Bacteria Urine Few (*)     All other components within normal limits   INR   CK TOTAL   ALCOHOL ETHYL   LACTIC ACID WHOLE BLOOD   INFLUENZA A/B & SARS-COV2 PCR MULTIPLEX   ACETAMINOPHEN LEVEL   SALICYLATE LEVEL   PERIPHERAL IV CATHETER   CARDIAC CONTINUOUS MONITORING   PULSE OXIMETRY NURSING   PULSE OXIMETRY NURSING   CARDIAC CONTINUOUS MONITORING   BLOOD CULTURE   BLOOD CULTURE   URINE CULTURE AEROBIC BACTERIAL     XR Chest Port 1 View   Final Result   IMPRESSION: No acute disease.      ESTELLA WILLINGHAM MD            Treatments and/or interventions provided: NS, Narcan, Ativan  Patient's response to treatments and/or interventions: Tolerated well    To be done/followed up on inpatient unit:  Continue with plan of care per admitting MD.    Does this patient have any cognitive concerns?: Forgetful    Activity level - Baseline/Home:  Independent  Activity Level - Current:   Total Care    Patient's Preferred language: English   Needed?: No    Isolation: None  Infection: Not Applicable  Patient tested for COVID 19 prior to admission: YES  Bariatric?: No    Vital Signs:   Vitals:    02/06/21 2130 02/06/21 2205 02/06/21 2210 02/06/21 2218   BP: 105/57      Pulse: 125 121 121    Resp: 10 11 10    Temp:    98.8  F (37.1  C)   TempSrc:    Rectal   SpO2: 95% 95% 95%        Cardiac Rhythm:Cardiac Rhythm: Sinus tachycardia    Was the PSS-3 completed:   No -AMS  What interventions are required if any?               Family Comments: Patient had a friend in the room. Friend left.  OBS brochure/video discussed/provided to patient/family: N/A                 For the majority of the shift this patient's behavior was Yellow.     ED NURSE PHONE NUMBER: [id number]

## 2021-02-07 NOTE — ED NOTES
Skin warm to touch.  Laying right lateral position.  Minimally responsive.  Responds to pain, stimulation; moaning, shakes head.  Does not eyes.  Does not answer questions.  Moans.  Rectal temp 100.9.  Dr Radford updated.

## 2021-02-07 NOTE — H&P
[facility]  History and Physical  Hospitalist       Date of Admission:  2/6/2021    *Patient has an duplicate chart under MRN [id number] which contains more past medical history and care everywhere data*    Assessment & Plan   Wendy Fisher is a 35 year old female with longstanding IV drug use (methamphetamine and heroin), many overdoses--both intentional suicide attempts and accidental overdoses, multiple psychiatric diagnoses in the past including anxiety, depression, possible bipolar affective disorder, hypothyroidism who was admitted on 2/6/2021 with complications relating to heroin overdose.     Heroin overdose  Respiratory Arrest secondary to above  Brought into ED with no respirations, thready pulse, hypothermic and per acquaintance had an overdose. Repeated opiate overdoses, some intentional but most, it seems, are not. Unable to ascertain if she currently has suicidal ideation. The acquaintance that brought her to the ED left and was asked to call back if she had any info about the drug(s) used and she did not. Responded well to narcan in ED (2 doses of 2 mg each) and abruptly sat up and began yawning, shivering and was tachycardic to 180s.  -admit to inpatient with intermediate care status, telemetry  -prn nasal narcan   -supplemental oxygen as needed  -Psychiatry consultation  -Chemical dependency evaluation  -check CK    Hypothermia, resolved  On presentation had rectal temp 91 F. Presumed exposure to below zero temperature during course of drug use. Initial lactate 7.6 likely due to hypoperfusion.   -warmed with alejandrina hugger and blankets, warmed IV fluids    Fever and leukocytosis  Fever 100.9 after rewarming. WBC 19,000. CXR clear. Cathed UA specimen abnormal with +nitrites, +LE, 5 WBC, few bacteria. Possible stress leukocytosis in setting of respiratory arrest and overdose and fever after active rewarming techniques. At the time of admission she could not give reliable  responses to whether she has urinary symptoms.    -follow up urine and blood cultures  -empiric treatment with ceftriaxone for now. Received 1st dose in ED.   -recheck WBC in AM    Chronic, stable problems:  Hypothyroidism: Doubt compliance prior to admission. Resume PTA levothyroxine once dose verified.    Ruled Out for COVID-19 infection  This patient was evaluated during a global COVID-19 pandemic, which necessitated consideration that the patient might be at risk for infection with the SARS-CoV-2 virus that causes COVID-19. Applicable protocols for evaluation were followed during the patient's care. Low suspicion for infection.   - NEGATIVE COVID-19 PCR test result    DVT prophylaxis: Pneumatic Compression Devices and Ambulate every shift  Code Status: Full    Disposition: Expected discharge in 2-3 days.    Danielle Altamirano MD  Text Page    ~~~~~~~~~~~~~~~~~~~~~~~~~~~~~~~~~~~~~~~~~~~~~~~~~~~~~  Primary Care Physician   Physician No Ref-Primary    Chief Complaint   Overdose, hypothermia, respiratory arrest    History is obtained from the patient and medical records.    History of Present Illness   Wendy Fisher is a 35 year old female with longstanding IV drug use (methamphetamine and heroin), many overdoses--both intentional suicide attempts and accidental overdoses, multiple psychiatric diagnoses in the past including anxiety, depression, possible bipolar affective disorder, hypothyroidism with current daily meth and heroin use who was brought in from just outside the hospital entrance by hospital staff. She was drive to the ED and the hospital staff found patient cyanotic with no spontaneous breathing, barely detectable pulse. Brought to stabe room. Responded well to narcan in ED (2 doses of 2 mg each) and abruptly sat up and began yawning, shivering and was tachycardic to 180s.     The acquaintance that brought her to the ED was agitated and promptly left and was asked to call back if she had any info about  the drug(s) used but she did not.     Patient has two MRN and was seen several days ago in our ED after an intentional overdose with what she thought was cocaine but it ended up being heroin. Responded to narcan, denied suicide intent then and was discharged home. Repeated opiate overdoses, some intentional but most it seems are not. Unable to ascertain if she currently has suicidal ideation.     Case reviewed with Dr. Radford from the Emergency Department. Labs and available imaging reviewed. Pertinent results include: initial lactate 7.6, then 0.9. UA with +nitrite, +LE. Chest xray clear.     Past Medical History    I have reviewed this patient's medical history and updated it with pertinent information if needed.   IV methamphetamine abuse  IV heroin abuse  Depression  Anxiety  Suicide attempts    Past Surgical History   I have reviewed this patient's surgical history and updated it with pertinent information if needed.  No past surgical history on file.    Prior to Admission Medications   None     Allergies   Not on File    Social History   I have reviewed this patient's social history and updated it with pertinent information if needed. Wendy JOSE Fisher      Family History   I have reviewed this patient's family history and updated it with pertinent information if needed.   No family history on file.    Review of Systems   The 10 point Review of Systems is negative other than noted in the HPI or here.    Physical Exam   Temp: 98.8  F (37.1  C) Temp src: Rectal BP: 105/57 Pulse: 121   Resp: 10 SpO2: 95 % O2 Device: None (Room air) Oxygen Delivery: 15 LPM  Vital Signs with Ranges  Temp:  [91.9  F (33.3  C)-100.9  F (38.3  C)] 98.8  F (37.1  C)  Pulse:  [101-186] 121  Resp:  [0-36] 10  BP: ()/(37-94) 105/57  SpO2:  [82 %-100 %] 95 %  0 lbs 0 oz    Constitutional: Disheveled, arouses to voice then quickly falls asleep  Eyes: pupils small, sclerae anicteric  HEENT: dentition poor, mmm,  atraumatic  Respiratory: Lungs CTAB, no wheezes or crackles  Cardiovascular: RRR, no murmurs  no LE edema  GI: soft, normal bowel sounds, nondistended  Skin/Integument: warm, dry, no acute rashes  +track marks on inner arms  Genitourinary: not examined  Musc: MEDINA  Neuro: not following commands  Psych: unable to fully assess given sedation    Data   Data reviewed today:  I personally reviewed: Chest xray clear.   Recent Labs   Lab 02/06/21  1908   WBC 19.0*   HGB 15.7   MCV 95      INR 0.97      POTASSIUM 4.8   CHLORIDE 105   CO2 28   BUN 12   CR 1.13*   ANIONGAP 9   TANVIR 9.2   *   ALBUMIN 3.8   PROTTOTAL 8.2   BILITOTAL 0.3   ALKPHOS 211*   *   *       Imaging:  Recent Results (from the past 24 hour(s))   XR Chest Port 1 View    Narrative    CHEST ONE VIEW  2/6/2021 9:31 PM     HISTORY: Fever.    COMPARISON: None.      Impression    IMPRESSION: No acute disease.    ESTELLA WILLINGHAM MD

## 2021-02-07 NOTE — PLAN OF CARE
Neuro- Somnolent/lethargic   Most Recent Vitals- Temp: 97.4  F (36.3  C) Temp src: Axillary BP: 125/81 Pulse: 105   Resp: 12 SpO2: 97 % O2 Device: None (Room air)   Tele/Cardiac- -115's, BP stable  Resp- Bradypnea with rates 10-12 BPM, O2 stable on RA, Unable to assess LS with deep breaths-sound clear with normal breathing   Activity- Ax1 to C  Pain- reported headache, PRN tylenol given  Drips- NS @ 100  Drains/Tubes- PIV  Skin- WDL ex BUE track marks  GI/- WDL ex tea colored urine  Aggression Color- Green  COVID status- Negative  Plan- Continue to monitor  Misc- NA    Rylie Daly RN

## 2021-02-07 NOTE — PROGRESS NOTES
Per patient, ok to call mom, Yobani and let her know she is here, #[phone].  Also ok to update number in chart for contact purposes and to give mom information.

## 2021-02-07 NOTE — PLAN OF CARE
VSS, no c/o pain, somewhat confused to situation and time, lethargic-mostly sleeping between cares, up to BSC with SBA, good urine output, fluids continue, psyche consulted via IPAD-proved to be very limited d/t pts lethargy, plan for additional psyche consult and chem dep at some point, updated patient's mother and grandma this shift per pt permission, will continue to monitor closely.

## 2021-02-07 NOTE — PROGRESS NOTES
[facility]  Hospitalist Progress Note    Admit Date:  2/6/2021  Date of Service (when I saw the patient): 02/07/2021   Provider:  Trudi Gonzalez, DO    Assessment & Plan   Wendy JOSE Fisher is a 35 year old female who was admitted on 2/6/2021 with decreased responsiveness .  She has a   longstanding IV drug use (methamphetamine and heroin), many overdoses--both intentional suicide attempts and accidental overdoses, multiple psychiatric diagnoses in the past including anxiety, depression, possible bipolar affective disorder, hypothyroidism who was admitted on 2/6/2021 with complications relating to heroin overdose.     Problem List:     1. Heroin overdose  Respiratory Arrest, secondary to above  Brought into ED with no respirations, thready pulse, hypothermic - per acquaintance had an overdose of unclear substance or amounts.  She responded well to narcan in ED (2 doses of 2 mg each) and abruptly sat up and began yawning, shivering and was tachycardic to 180s.  Her tachycardia is improving with supportive care    -supplemental oxygen as needed  -Psychiatry consultation requested as unclear if there was a suicidal component  -Chemical dependency evaluation will be requested  CK not elevated     2. Hypothermia, resolved  On presentation had rectal temp 91 F. Presumed exposure to below zero temperature during course of drug use. Initial lactate 7.6 likely due to hypoperfusion - normalized on repeat testing and treatment with warmed with alejandrina hugger and blankets, warmed IV fluids     3. Fever and leukocytosis  Fever 100.9 after rewarming. WBC 19,000. CXR clear. Cathed UA specimen abnormal with +nitrites, +LE, 5 WBC, few bacteria. Possible stress leukocytosis in setting of respiratory arrest and overdose and fever after active rewarming techniques. At the time of admission she could not give reliable responses to whether she has urinary symptoms.    -follow up urine and blood cultures - still  "pending  -empiric treatment with ceftriaxone for now. Received 1st dose in ED.   Leukocytosis noted to improving 13.7 (19.0)     4.  Hypoglycemia, mild  BS 72 this am - will change IVF to D5NS  Will see if she will tolerate po intake later today    HCG - noted to be negative this am    Chronic, stable problems:  Hypothyroidism: Doubt compliance prior to admission. Resume PTA levothyroxine once dose verified.     Ruled Out for COVID-19 infection  This patient was evaluated during a global COVID-19 pandemic, which necessitated consideration that the patient might be at risk for infection with the SARS-CoV-2 virus that causes COVID-19. Applicable protocols for evaluation were followed during the patient's care. Low suspicion for infection.   - NEGATIVE COVID-19 PCR test result    DVT prophylaxis: Pneumatic Compression Devices and Ambulate every shift  Code Status: Full     Disposition: Expected discharge in several days       Diet: Combination Diet Regular Diet Adult    DVT Prophylaxis: Enoxaparin (Lovenox) SQ  Kruse Catheter: not present  Code Status: Full Code      Disposition Plan   Expected discharge: 2 - 3 days, recommended to unclear disposition once mental status at baseline.  Entered: Trudi Gonzalez,  02/07/2021, 7:25 AM       The patient's care was discussed with the Bedside Nurse and Patient.    Interval History   Sleepy this am.  Not c/o HA, CP or SOB.  \"hungry\" this am.  No N/V.  Not up out of bed much since admission.  No other new concerns voiced per nursing staff.    -Data reviewed today: I reviewed all new labs and imaging results over the last 24 hours. I personally reviewed no images or EKG's today.    Physical Exam   Temp: 97.4  F (36.3  C) Temp src: Axillary BP: 123/72 Pulse: 103   Resp: 10 SpO2: 97 % O2 Device: None (Room air) Oxygen Delivery: 15 LPM  There were no vitals filed for this visit.  Vital Signs with Ranges  Temp:  [91.9  F (33.3  C)-100.9  F (38.3  C)] 97.4  F (36.3 "  C)  Pulse:  [] 103  Resp:  [0-36] 10  BP: ()/(37-94) 123/72  SpO2:  [82 %-100 %] 97 %  I/O last 3 completed shifts:  In: -   Out: 800 [Urine:800]    GEN:  Sleepy but arousable. Appears comfortable and not restless  HEENT:  Normocephalic/atraumatic, no scleral icterus, no nasal discharge, mouth and membranes appears moist, no oral ulcers or thrush noted; tongue ring noted without s/s of infection  NECK:  No clear thyromegaly of clear JVD  CV:  Tachy but regular, no loud murmur to ausc.  S1 + S2 noted, no S3 or S4.  LUNGS:  Clear to auscultation ant/lat/post bilaterally.  No rales/rhonchi/wheezing auscultated bilaterally.  No costal retractions bilaterally.  Symmetric chest rise on inhalation noted.  ABD:  Active bowel sounds, soft, non-tender/non-distended.  No rebound/guarding/rigidity.  No masses palpated.  No obvious HSM to exam.  EXT:  No pretibial edema or cyanosis bilaterally.mild dependent edema noted in both hands on exam. No joint synovitis noted.  No calf-tenderness or asymmetry noted.  SKIN:  Dry to touch, no rashes or jaundice noted.  PSYCH:  Mood calm and not agitated at time of exam  NEURO:  No tremors at rest.  Moving all ext without clear focal difficulty or deficits.  Mild lateral nystagmus on exam.    Data   Labs:  Recent Labs   Lab 02/07/21  0636 02/06/21  1908    142   POTASSIUM 4.0 4.8   CHLORIDE 112* 105   CO2 26 28   ANIONGAP 4 9   GLC 72 142*   BUN 14 12   CR 0.77 1.13*   GFRESTIMATED >90 62   GFRESTBLACK >90 72   TANVIR 8.0* 9.2     Recent Labs   Lab 02/06/21  2108   COLOR Light Yellow   APPEARANCE Clear   URINEGLC 30*   URINEBILI Negative   URINEKETONE Negative   SG 1.007   UBLD Negative   URINEPH 6.0   PROTEIN Negative   NITRITE Positive*   LEUKEST Moderate*   RBCU 1   WBCU 5      HCG - negative  Recent Imaging:   Recent Results (from the past 24 hour(s))   XR Chest Port 1 View    Narrative    CHEST ONE VIEW  2/6/2021 9:31 PM     HISTORY: Fever.    COMPARISON: None.       Impression    IMPRESSION: No acute disease.    ESTELLA WILLINGHAM MD       Medications     sodium chloride 100 mL/hr at 02/07/21 0024       cefTRIAXone  1 g Intravenous Q24H     sodium chloride (PF)  3 mL Intracatheter Q8H

## 2021-02-07 NOTE — ED TRIAGE NOTES
"Ar[place] with \"friend\" in private car [place] round[place] in triage. Friend came in and stated she was unresponsive and a heroin user. Patient found to be unresponsive despite sternal rub, central cyanosis, with pulse present. Triage RN gave narcan 2mg intranasally in car while meño was brought outside to get patient inside.  "

## 2021-02-07 NOTE — CONSULTS
[facility]    Psychiatry Consultation     Date of Admission:  2/6/2021  Date of Consult (When I saw the patient): 02/07/21    Assessment & Plan   Wendy Fisher is a 35 year old female who was admitted on 2/6/2021. I was asked to see the patient for opiate overdose.    Principal Diagnosis:   Opiate use disorder severe  Rule out methamphetamine use disorder  Nicotine use disorder  History of depression and anxiety  Secondary psychiatric diagnoses of concern this admission:  Principal Problem:    Opiate overdose, accidental or unintentional, initial encounter (H)  Active Problems:    Respiratory arrest (H)    Respiratory acidosis    Mild methamphetamine abuse (H)    Active intravenous drug use    Suicide attempt by drug overdose (H)      Recommendation    1 medical stabilization as per primary treatment team  2.  Patient may benefit from being on Suboxone maintenance .  She goes to [facility]  She would benefit from a CD consult  Please put a follow-up psychiatric consult when patient is more alert to get more information about mental health and medications    As patient is very sleepy just needed Narcan I am going to wait for her to be more alert to order any Suboxone.     The risks, benefits, alternatives and side effects have been discussed and are understood by the patient and other caregivers.    Ameya Torres    Reason for Consult   Reason for consult: Heroin overdose    Primary Care Physician   *Physician No Ref-Primary    Chief Complaint   Opiates  History is obtained from the patient, chart review also reviewed the second chart with the different medical record number.      History of Present Illness   Wendy Fisher is a 35 year old female who presents with substance use.  This is 1 of 2 admits in February.  Patient was in the emergency room on February 1.  She came again to the emergency room on the 2/6/2021.  Apparently patient was found outside the hospital after being dropped off by  an acquaintance.  Patient was cyanotic not breathing not able to find a pulse.  Narcan was given.    Patient is a poor historian keeps falling asleep needs to be aroused several times.  Patient reports opiates are her drug of choice she has been using it for several years she has tolerance withdrawal she is tried to quit unsuccessfully she was in a methadone clinic she has used despite having negative consequences money overdoses.  She has loss of control.    She also has been using meth breakable amounts frequencies but this is going on for several years.  She smokes 1 pack a day she denies using any alcohol    She endorses having depression and anxiety but is not able to elaborate on any symptoms for me.  She denies suicidal ideation plan or intent she states this is not a suicide attempt        Past Medical History   I have reviewed this patient's medical history and updated it with pertinent information if needed.   Past Medical History:   Diagnosis Date     Active intravenous drug use 2/6/2021    Heroin and meth     Mild methamphetamine abuse (H) 2/6/2021     Suicide attempt by drug overdose (H) 2/6/2021       Past Psychiatric History   Patient was being psychiatrically hospitalized but was in several chemical dependency treatments last one was in summer.  She keeps falling asleep and is not able to elaborate on any of the previous admissions    Past Surgical History   I have reviewed this patient's surgical history and updated it with pertinent information if needed.  No past surgical history on file.    Prior to Admission Medications   Prior to Admission Medications   Prescriptions Last Dose Informant Patient Reported? Taking?   FLUoxetine (PROZAC) 40 MG capsule   Yes No   Sig: Take 40 mg by mouth daily   amphetamine-dextroamphetamine (ADDERALL) 20 MG tablet   Yes No   Sig: Take 20 mg by mouth daily   atomoxetine (STRATTERA) 40 MG capsule   Yes No   Sig: Take 40 mg by mouth daily   buPROPion (WELLBUTRIN SR)  200 MG 12 hr tablet   Yes No   Sig: Take 200 mg by mouth 2 times daily   buprenorphine HCl-naloxone HCl (SUBOXONE) 2-0.5 MG per film   Yes No   Sig: Place 1 Film under the tongue daily   cloNIDine (CATAPRES) 0.1 MG tablet   Yes No   Sig: Take 0.1 mg by mouth 3 times daily as needed   gabapentin (NEURONTIN) 300 MG capsule   Yes No   Sig: Take 300 mg by mouth 3 times daily   levonorgestrel-ethinyl estradiol (AVIANE) 0.1-20 MG-MCG tablet   Yes No   Sig: Take 1 tablet by mouth daily   levothyroxine (SYNTHROID/LEVOTHROID) 112 MCG tablet   Yes No   Sig: Take 112 mcg by mouth daily   magic mouthwash suspension (diphenhydrAMINE, lidocaine, aluminum-magnesium & simethicone)   Yes No   Sig: Swish and swallow 5 mLs in mouth every 6 hours as needed for mouth sores   modafinil (PROVIGIL) 100 MG tablet   Yes No   Sig: Take 100 mg by mouth daily   naloxone (NARCAN) 4 MG/0.1ML nasal spray   Yes No   Sig: Spray 4 mg into one nostril alternating nostrils once as needed for opioid reversal every 2-3 minutes until assistance arrives   naproxen (NAPROSYN) 500 MG tablet   Yes No   Sig: Take 500 mg by mouth 2 times daily as needed for moderate pain      Facility-Administered Medications: None     Allergies   Not on File    Social History   I have reviewed this patient's social history and updated it with pertinent information if needed. Wendy JOSE Fisher      Family History   I have reviewed this patient's family history and updated it with pertinent information if needed.   No family history on file.    Review of Systems   The 10 point Review of Systems is negative other than noted in the HPI or here I have reviewed the 10 point review system done by Dr. Altamirano on 2/6/2021    Physical Exam     Vital Signs with Ranges  Temp:  [91.9  F (33.3  C)-100.9  F (38.3  C)] 98.4  F (36.9  C)  Pulse:  [] 107  Resp:  [0-36] 16  BP: ()/(37-94) 123/69  SpO2:  [82 %-100 %] 97 %  0 lbs 0 oz    A 10-point review of systems is reviewed and is  negative except for psychiatric symptoms above.       Allergies reviewed  @vital    vitals  Appearance: Disheveled with poor grooming poor hygiene keeps falling asleep  Attitude: Tries to be cooperative to cooperative  Eye Contact:   Poor  Mood:   Flat  Affect:  congruent   Speech:  clear, coherent normal rate   Psychomotor Behavior:  no evidence of tardive dyskinesia, dystonia, or tics  Thought Process:  logical, linear and goal oriented  Associations:  no loose associations  Thought Content:  no evidence of psychotic thought and active suicidal ideation present  Denied any active suicidal /homicidation ideation plan intent   Insight:  fair  Judgment:  fair  Oriented to:  time, person, and place  Attention Span and Concentration:   Less than adequate  Recent and Remote Memory: Less than adequate  Language:  english with appropriate syntax and vocabulary  Fund of Knowledge: appropriate  Muscle Strength and Tone: normal  Gait and Station: Normal              Data   Results for orders placed or performed during the hospital encounter of 02/06/21 (from the past 24 hour(s))   CBC with platelets differential   Result Value Ref Range    WBC 19.0 (H) 4.0 - 11.0 10e9/L    RBC Count 5.15 3.8 - 5.2 10e12/L    Hemoglobin 15.7 11.7 - 15.7 g/dL    Hematocrit 48.9 (H) 35.0 - 47.0 %    MCV 95 78 - 100 fl    MCH 30.5 26.5 - 33.0 pg    MCHC 32.1 31.5 - 36.5 g/dL    RDW 11.9 10.0 - 15.0 %    Platelet Count 248 150 - 450 10e9/L    Diff Method Automated Method     % Neutrophils 82.5 %    % Lymphocytes 8.8 %    % Monocytes 5.6 %    % Eosinophils 0.4 %    % Basophils 0.3 %    % Immature Granulocytes 2.4 %    Nucleated RBCs 0 0 /100    Absolute Neutrophil 15.7 (H) 1.6 - 8.3 10e9/L    Absolute Lymphocytes 1.7 0.8 - 5.3 10e9/L    Absolute Monocytes 1.1 0.0 - 1.3 10e9/L    Absolute Eosinophils 0.1 0.0 - 0.7 10e9/L    Absolute Basophils 0.1 0.0 - 0.2 10e9/L    Abs Immature Granulocytes 0.5 (H) 0 - 0.4 10e9/L    Absolute Nucleated RBC 0.0     Comprehensive metabolic panel   Result Value Ref Range    Sodium 142 133 - 144 mmol/L    Potassium 4.8 3.4 - 5.3 mmol/L    Chloride 105 94 - 109 mmol/L    Carbon Dioxide 28 20 - 32 mmol/L    Anion Gap 9 3 - 14 mmol/L    Glucose 142 (H) 70 - 99 mg/dL    Urea Nitrogen 12 7 - 30 mg/dL    Creatinine 1.13 (H) 0.52 - 1.04 mg/dL    GFR Estimate 62 >60 mL/min/[1.73_m2]    GFR Estimate If Black 72 >60 mL/min/[1.73_m2]    Calcium 9.2 8.5 - 10.1 mg/dL    Bilirubin Total 0.3 0.2 - 1.3 mg/dL    Albumin 3.8 3.4 - 5.0 g/dL    Protein Total 8.2 6.8 - 8.8 g/dL    Alkaline Phosphatase 211 (H) 40 - 150 U/L     (H) 0 - 50 U/L     (H) 0 - 45 U/L   INR   Result Value Ref Range    INR 0.97 0.86 - 1.14   Lactic acid whole blood   Result Value Ref Range    Lactic Acid 7.6 (HH) 0.7 - 2.0 mmol/L   CK total   Result Value Ref Range    CK Total 118 30 - 225 U/L   Alcohol ethyl   Result Value Ref Range    Ethanol g/dL <0.01 <0.01 g/dL   Blood gas venous   Result Value Ref Range    Ph Venous 7.13 (LL) 7.32 - 7.43 pH    PCO2 Venous 92 (HH) 40 - 50 mm Hg    PO2 Venous 9 (L) 25 - 47 mm Hg    Bicarbonate Venous 30 (H) 21 - 28 mmol/L    Base Deficit Venous 2.5 mmol/L    FIO2 0    Blood culture    Specimen: Blood    Right Arm   Result Value Ref Range    Specimen Description Blood Right Arm     Culture Micro No growth after 7 hours    Lactic acid whole blood   Result Value Ref Range    Lactic Acid 0.9 0.7 - 2.0 mmol/L   Blood gas venous   Result Value Ref Range    Ph Venous 7.35 7.32 - 7.43 pH    PCO2 Venous 53 (H) 40 - 50 mm Hg    PO2 Venous 61 (H) 25 - 47 mm Hg    Bicarbonate Venous 29 (H) 21 - 28 mmol/L    Base Excess Venous 2.6 mmol/L    FIO2 Room air    Blood culture    Specimen: Blood    Right Arm   Result Value Ref Range    Specimen Description Blood Right Arm     Culture Micro No growth after 7 hours    UA with Microscopic reflex to Culture    Specimen: Catheterized Urine   Result Value Ref Range    Color Urine Light Yellow      Appearance Urine Clear     Glucose Urine 30 (A) NEG^Negative mg/dL    Bilirubin Urine Negative NEG^Negative    Ketones Urine Negative NEG^Negative mg/dL    Specific Gravity Urine 1.007 1.003 - 1.035    Blood Urine Negative NEG^Negative    pH Urine 6.0 5.0 - 7.0 pH    Protein Albumin Urine Negative NEG^Negative mg/dL    Urobilinogen mg/dL 0.0 0.0 - 2.0 mg/dL    Nitrite Urine Positive (A) NEG^Negative    Leukocyte Esterase Urine Moderate (A) NEG^Negative    Source Catheterized Urine     WBC Urine 5 0 - 5 /HPF    RBC Urine 1 0 - 2 /HPF    Bacteria Urine Few (A) NEG^Negative /HPF    Squamous Epithelial /HPF Urine <1 0 - 1 /HPF    Transitional Epi <1 0 - 1 /HPF   Urine Culture    Specimen: Catheterized Urine   Result Value Ref Range    Specimen Description Catheterized Urine     Special Requests Specimen received in preservative     Culture Micro (A)      >100,000 colonies/mL  Escherichia coli  Susceptibility testing in progress     Symptomatic Influenza A/B & SARS-CoV2 (COVID-19) Virus PCR Multiplex    Specimen: Nasopharyngeal   Result Value Ref Range    Flu A/B & SARS-COV-2 PCR Source Nasopharyngeal     SARS-CoV-2 PCR Result NEGATIVE     Influenza A PCR Negative NEG^Negative    Influenza B PCR Negative NEG^Negative    Respiratory Syncytial Virus PCR (Note)     Flu A/B & SARS-CoV-2 PCR Comment (Note)    XR Chest Port 1 View    Narrative    CHEST ONE VIEW  2/6/2021 9:31 PM     HISTORY: Fever.    COMPARISON: None.      Impression    IMPRESSION: No acute disease.    ESTELLA WILLINGHAM MD   Acetaminophen level   Result Value Ref Range    Acetaminophen Level 2 mg/L   Salicylate level   Result Value Ref Range    Salicylate Level <2 mg/dL   EKG 12 lead   Result Value Ref Range    Interpretation ECG Click View Image link to view waveform and result    CBC with platelets   Result Value Ref Range    WBC 13.7 (H) 4.0 - 11.0 10e9/L    RBC Count 3.88 3.8 - 5.2 10e12/L    Hemoglobin 11.9 11.7 - 15.7 g/dL    Hematocrit 35.7 35.0 - 47.0 %     MCV 92 78 - 100 fl    MCH 30.7 26.5 - 33.0 pg    MCHC 33.3 31.5 - 36.5 g/dL    RDW 12.3 10.0 - 15.0 %    Platelet Count 187 150 - 450 10e9/L   Basic metabolic panel   Result Value Ref Range    Sodium 142 133 - 144 mmol/L    Potassium 4.0 3.4 - 5.3 mmol/L    Chloride 112 (H) 94 - 109 mmol/L    Carbon Dioxide 26 20 - 32 mmol/L    Anion Gap 4 3 - 14 mmol/L    Glucose 72 70 - 99 mg/dL    Urea Nitrogen 14 7 - 30 mg/dL    Creatinine 0.77 0.52 - 1.04 mg/dL    GFR Estimate >90 >60 mL/min/[1.73_m2]    GFR Estimate If Black >90 >60 mL/min/[1.73_m2]    Calcium 8.0 (L) 8.5 - 10.1 mg/dL   Procalcitonin   Result Value Ref Range    Procalcitonin 1.00 ng/ml   CK total   Result Value Ref Range    CK Total 87 30 - 225 U/L   HCG qualitative Blood   Result Value Ref Range    HCG Qualitative Serum Negative NEG^Negative         Telemedicine Visit: The patient's condition can be safely assessed and treated via synchronous audio and visual telemedicine encounter.   Start Time: 1:11 pM  Stop Time: 1:20 pM  Reason for Telemedicine Visit: Covid-19   Originating Site (Patient Location): [facility] bed 0260 bed 1  Distant Site (Provider Location): Provider Remote Setting   Consent: The patient/guardian has verbally consented to: the potential risks and benefits of telemedicine (video visit) versus in person care; bill my insurance or make self-payment for services provided; and responsibility for payment of non-covered services.   Mode of Communication: Video Conference via [employer]  As the provider I attest to compliance with applicable laws and regulations related to telemedicine.       The patient/guardian has been notified of the following:   This telemedicine visit is conducted live between you and your clinician. We have found that certain health care needs can be provided without the need for a physical exam. This service lets us provide the care you need with a telemedicine conversation.

## 2021-02-07 NOTE — ED PROVIDER NOTES
History   Chief Complaint:  Drug Overdose       The history is limited by the condition of the patient.      Wendy Fisher is a 35 year old female with a history of drug abuse who presents in respiratory arrest after a drug overdose. Per chart review, the patient was seen in the ED on  after a drug overdose thinking she was using cocaine, but it ended up being heroin. She was treated with narcan and eventually discharged. Today, the patient was found outside the hospital after being dropped off by an acquaintance. The nurse who found the patient noted that the patient was cyanotic, not breathing, and was unable to find a pulse. Narcan was administered and the patient began breathing and a pulse was found. The patient remained mostly unresponsive, with intermittent episodes where she wakes up and tries to get out of the bed, before becoming unresponsive again. Track marks were noted on the patient's arms. No evidence of trauma noted.        Review of Systems   Unable to perform ROS: Patient unresponsive     Allergies  The patient has no known allergies.      Medications  Wellbutrin  Prozac  Synthroid  Levothyroxine  Provigil     Past Medical History  Anxiety  Anemia in pregnancy  Drug abuse     Past Surgical History       Family History  Hypertension  Thyroid disease    Social History:  Patient presents to the ED with a friend.     Physical Exam     Patient Vitals for the past 24 hrs:   BP Temp Temp src Pulse Resp SpO2   21 2300 106/75 -- -- 108 11 95 %   21 2245 115/68 -- -- 109 10 96 %   21 2230 107/59 -- -- 116 10 94 %   218 -- 98.8  F (37.1  C) Rectal -- -- --   21 2215 110/61 -- -- 117 12 96 %   21 -- -- -- 121 10 95 %   21 2205 -- -- -- 121 11 95 %   21 2130 105/57 -- -- 125 10 95 %   21 2115 93/45 -- -- 131 13 94 %   21 2100 106/59 -- -- 135 15 94 %   21 2045 106/62 -- -- 137 15 94 %   21 2030 110/59 -- -- 135 15 94 %    02/06/21 2015 118/66 -- -- 131 15 95 %   02/06/21 2000 133/84 100.9  F (38.3  C) Rectal 135 28 94 %   02/06/21 1945 102/56 -- -- 144 16 100 %   02/06/21 1930 (!) 129/94 -- -- 140 30 99 %   02/06/21 1926 (!) 151/93 -- -- 140 30 98 %   02/06/21 1925 (!) 151/93 -- -- 130 17 99 %   02/06/21 1915 -- -- -- 179 20 (!) 82 %   02/06/21 1910 (!) 74/38 -- -- 186 24 95 %   02/06/21 1900 (!) 62/37 -- -- 140 (!) 36 (!) 85 %   02/06/21 1855 -- -- -- 134 14 97 %   02/06/21 1845 -- -- -- 137 13 94 %   02/06/21 1830 98/61 (!) 91.9  F (33.3  C) Rectal 133 15 100 %   02/06/21 1825 110/82 -- -- 112 10 100 %   02/06/21 1820 (!) 149/90 -- -- 101 (!) 0 --       Physical Exam  Physical Exam   Nursing note and vitals reviewed.  General: Initially obtunded.  Multiple track marks, thin   Head:  No evidence of trauma  Mouth/Throat: Oropharynx is clear and moist.   Eyes: Conjunctivae are normal. Pupils are equal, round, and reactive to light.   Neck: Normal range of motion. No nuchal rigidity.   Cardiovascular: Tachycardic rate and regular rhythm.    Respiratory: Effort normal and breath sounds normal. Shallow respirations  Abdominal: Soft. There is no tenderness. There is no guarding.   Musculoskeletal: Normal range of motion. no edema.   Neurological: Intermittent somnolence. Intermittently wakes up and tries to get out of the bed..   Skin: Skin is warm and dry. No rash noted.   Psychiatric: Agitated when awake      Emergency Department Course     ECG:  ECG taken at 1831, ECG read at 1832  Undetermined rhythm   Nonspecific ST abnormality   Abnormal ECG  Rate 121 bpm. AL interval * ms. QRS duration 96 ms. QT/QTc 376/533 ms. P-R-T axes * 88 35.    ECG 2:  ECG taken at 1903, ECG read at 1904  Undetermined rhythm   Rightward axis  ST & T wave abnormality, consider inferior ischemia   Abnormal ECG  Rate 150 bpm. AL interval * ms. QRS duration 82 ms. QT/QTc 304/480 ms. P-R-T axes * 90 13.    ECG 3:  ECG taken at 2254, ECG read at 2256  Sinus  tachycardia   Otherwise normal ECG  Rate 112 bpm. PA interval 132 ms. QRS duration 88 ms. QT/QTc 338/461 ms. P-R-T axes 81 84 65.       Imaging:  XR Chest Port 1 View  No acute disease.  ESTELLA WILLINGHAM MD  Reading per radiology     Laboratory:    UA with micro: GLC 30 (A), nitrite positive (A), Leukocyte esterase moderate (A), bacteria few (A) o/w negative    Acetaminophen level: 2  Salicylate level: <2    CBC: WBC 19.0 (H), HGB 15.7,   CMP:  (H), creatinine 1.13 (H), ALKPHOS 211 (H),  (H),  (H) o/w WNL   INR: 0.97  CK total: 118  Alcohol ethyl: <0.01     Blood Gas Venous (Collected 1908): pH 7.13 (LL), PCO2 92 (HH), PO2 9 (L), Bicarbonate 30 (H)  Blood Gas Venous (Collected 2108) : pH 7.35, PCO2 53 (H), PO2 61 (H), Bicarbonate 29 (H)    Lactic Acid (Resulted: 1928): 7.6 (HH)  Lactic Acid (Resulted: 2122): 0.9     Symptomatic Influenza A/B & SARS-CoV2 (COVID-19) Virus PCR Multiplex: negative     Procedures    Emergency Department Course:    Assessments:  1820 I performed an exam of the patient as documented above.   1916 Patient rechecked and updated.    2113 Patient rechecked and updated.      Consults:   2118 I spoke with Poison Control regarding patient's presentation, findings, and plan of care.   2212 I spoke with Poison Control regarding patient's presentation, findings, and plan of care.   2220 I spoke with Dr. Altamirano of the hospitalist service regarding patient's presentation, findings, and plan of care.     Interventions:  1818 Narcan 2 mg   1821 Narcan 4 mg   1843 NS 1 L IV  1925 Ativan 0.5 mg IV   1937 Ativan 0.5 mg IV   1947 Ativan 0.5 mg IV   2115 NS 1770 mL IV  2330    Rocephin 500mg IV    Disposition:  The patient was admitted to the hospital under the care of Dr. Altamirano.       Impression & Plan     Medical Decision Making:  The evaluation of altered mental status in this situation involved consideration of a broad differential which includes the following:  A primary  neurologic cause such as, Stroke, Seizure, or Bleed  Systemic Disease in broad catergories such as,    Cardiovascular (Hypotension, low cardiac output),    Pulmonary (Hypoxia),    Renal (Uremia, Hypo/Hypernatremia, Hypercalcemia),    Liver (Hepatic encephalopathy),    Endocrine (hypoglycemia, thyroid dysfunction)   Infection: CNS (meningitis/encephalitis), or systemic infection (anything - PNA, UTIs, veronica in the elderly)  Drug Intoxication or Withdrawal: Opiates, BZDs, illicit drugs, EtOH intoxication or withdrawal  A psychiatric disorder or dementia are diagnoses of exclusion.    This patient presented to the triage area in respiratory arrest.  She was resuscitated with the assistance of nasal Narcan.  Initially she was hypothermic.  She received 1 L of normal saline and her initial respiratory acidosis had improved once she started to breathe again and after fluid resuscitation.  She then developed a fever.  This was thought to be likely secondary to shivering of withdrawal symptoms combined with correction of her hypothermia under Parviz hugger and multiple blankets.  Despite this with an elevated lactic acid and a now a new fever sepsis work-up was initiated.  The patient may have a urinary tract infection, culture will be sent.  Empiric Rocephin will be given.   She will require admission for further evaluation and treatment    Covid-19  Wendy Fisher was evaluated during a global COVID-19 pandemic, which necessitated consideration that the patient might be at risk for infection with the SARS-CoV-2 virus that causes COVID-19.   Applicable protocols for evaluation were followed during the patient's care.   COVID-19 was considered as part of the patient's evaluation. The plan for testing is:  a test was obtained during this visit.     Critical Care Time: was 100 minutes for this patient excluding procedures        Diagnosis:    ICD-10-CM    1. Respiratory arrest (H)  R09.2 Blood culture     Blood culture     UA with  Microscopic reflex to Culture     Urine Culture     Symptomatic Influenza A/B & SARS-CoV2 (COVID-19) Virus PCR Multiplex     Acetaminophen level     Acetaminophen level     Salicylate level     Salicylate level     CANCELED: Asymptomatic SARS-CoV-2 COVID-19 Virus (Coronavirus) by PCR   2. Respiratory acidosis  E87.2    3. Opiate overdose, accidental or unintentional, initial encounter (H)  T40.601A    4. Hypothermia, initial encounter  T68.XXXA        Scribe Disclosure:  I, Matias Weston, am serving as a scribe at 6:31 PM on 2/6/2021 to document services personally performed by Tommy Radford MD based on my observations and the provider's statements to me.          Tommy Radford MD  02/06/21 [id number]

## 2021-02-08 LAB
ANION GAP SERPL CALCULATED.3IONS-SCNC: 6 MMOL/L (ref 3–14)
BACTERIA SPEC CULT: ABNORMAL
BASOPHILS # BLD AUTO: 0 10E9/L (ref 0–0.2)
BASOPHILS NFR BLD AUTO: 0.3 %
BUN SERPL-MCNC: 6 MG/DL (ref 7–30)
CALCIUM SERPL-MCNC: 8.4 MG/DL (ref 8.5–10.1)
CHLORIDE SERPL-SCNC: 107 MMOL/L (ref 94–109)
CO2 SERPL-SCNC: 26 MMOL/L (ref 20–32)
CREAT SERPL-MCNC: 0.62 MG/DL (ref 0.52–1.04)
DIFFERENTIAL METHOD BLD: NORMAL
EOSINOPHIL # BLD AUTO: 0.1 10E9/L (ref 0–0.7)
EOSINOPHIL NFR BLD AUTO: 0.7 %
ERYTHROCYTE [DISTWIDTH] IN BLOOD BY AUTOMATED COUNT: 11.9 % (ref 10–15)
GFR SERPL CREATININE-BSD FRML MDRD: >90 ML/MIN/{1.73_M2}
GLUCOSE SERPL-MCNC: 140 MG/DL (ref 70–99)
HCT VFR BLD AUTO: 39.1 % (ref 35–47)
HGB BLD-MCNC: 13.1 G/DL (ref 11.7–15.7)
IMM GRANULOCYTES # BLD: 0 10E9/L (ref 0–0.4)
IMM GRANULOCYTES NFR BLD: 0.4 %
LYMPHOCYTES # BLD AUTO: 1.6 10E9/L (ref 0.8–5.3)
LYMPHOCYTES NFR BLD AUTO: 22.5 %
Lab: ABNORMAL
MCH RBC QN AUTO: 30.3 PG (ref 26.5–33)
MCHC RBC AUTO-ENTMCNC: 33.5 G/DL (ref 31.5–36.5)
MCV RBC AUTO: 91 FL (ref 78–100)
MONOCYTES # BLD AUTO: 0.6 10E9/L (ref 0–1.3)
MONOCYTES NFR BLD AUTO: 8.6 %
NEUTROPHILS # BLD AUTO: 4.7 10E9/L (ref 1.6–8.3)
NEUTROPHILS NFR BLD AUTO: 67.5 %
NRBC # BLD AUTO: 0 10*3/UL
NRBC BLD AUTO-RTO: 0 /100
PLATELET # BLD AUTO: 213 10E9/L (ref 150–450)
POTASSIUM SERPL-SCNC: 3.8 MMOL/L (ref 3.4–5.3)
RBC # BLD AUTO: 4.32 10E12/L (ref 3.8–5.2)
SODIUM SERPL-SCNC: 139 MMOL/L (ref 133–144)
SPECIMEN SOURCE: ABNORMAL
WBC # BLD AUTO: 7 10E9/L (ref 4–11)

## 2021-02-08 PROCEDURE — 36415 COLL VENOUS BLD VENIPUNCTURE: CPT | Performed by: INTERNAL MEDICINE

## 2021-02-08 PROCEDURE — 80048 BASIC METABOLIC PNL TOTAL CA: CPT | Performed by: INTERNAL MEDICINE

## 2021-02-08 PROCEDURE — 99232 SBSQ HOSP IP/OBS MODERATE 35: CPT | Performed by: INTERNAL MEDICINE

## 2021-02-08 PROCEDURE — 250N000013 HC RX MED GY IP 250 OP 250 PS 637: Performed by: INTERNAL MEDICINE

## 2021-02-08 PROCEDURE — 250N000011 HC RX IP 250 OP 636: Performed by: INTERNAL MEDICINE

## 2021-02-08 PROCEDURE — 120N000001 HC R&B MED SURG/OB

## 2021-02-08 PROCEDURE — 85025 COMPLETE CBC W/AUTO DIFF WBC: CPT | Performed by: INTERNAL MEDICINE

## 2021-02-08 RX ORDER — CLONIDINE HYDROCHLORIDE 0.1 MG/1
0.1 TABLET ORAL EVERY 8 HOURS
Status: DISCONTINUED | OUTPATIENT
Start: 2021-02-08 | End: 2021-02-10 | Stop reason: HOSPADM

## 2021-02-08 RX ORDER — CARBOXYMETHYLCELLULOSE SODIUM 5 MG/ML
2 SOLUTION/ DROPS OPHTHALMIC
Status: DISCONTINUED | OUTPATIENT
Start: 2021-02-08 | End: 2021-02-10 | Stop reason: HOSPADM

## 2021-02-08 RX ORDER — BUPRENORPHINE AND NALOXONE 4; 1 MG/1; MG/1
1 FILM, SOLUBLE BUCCAL; SUBLINGUAL ONCE
Status: COMPLETED | OUTPATIENT
Start: 2021-02-08 | End: 2021-02-08

## 2021-02-08 RX ORDER — VALPROIC ACID 250 MG/1
250 CAPSULE, LIQUID FILLED ORAL 2 TIMES DAILY
Status: DISCONTINUED | OUTPATIENT
Start: 2021-02-08 | End: 2021-02-10 | Stop reason: HOSPADM

## 2021-02-08 RX ORDER — LORAZEPAM 2 MG/ML
0.5 INJECTION INTRAMUSCULAR EVERY 6 HOURS PRN
Status: DISCONTINUED | OUTPATIENT
Start: 2021-02-08 | End: 2021-02-08

## 2021-02-08 RX ORDER — LORAZEPAM 0.5 MG/1
.5-1 TABLET ORAL EVERY 6 HOURS PRN
Status: DISCONTINUED | OUTPATIENT
Start: 2021-02-08 | End: 2021-02-08

## 2021-02-08 RX ADMIN — ACETAMINOPHEN 650 MG: 325 TABLET, FILM COATED ORAL at 06:32

## 2021-02-08 RX ADMIN — CEFTRIAXONE SODIUM 1 G: 1 INJECTION, POWDER, FOR SOLUTION INTRAMUSCULAR; INTRAVENOUS at 21:37

## 2021-02-08 RX ADMIN — LORAZEPAM 0.5 MG: 0.5 TABLET ORAL at 16:39

## 2021-02-08 RX ADMIN — BUPRENORPHINE HYDROCHLORIDE, NALOXONE HYDROCHLORIDE 1 FILM: 4; 1 FILM, SOLUBLE BUCCAL; SUBLINGUAL at 18:34

## 2021-02-08 RX ADMIN — CLONIDINE HYDROCHLORIDE 0.1 MG: 0.1 TABLET ORAL at 16:39

## 2021-02-08 RX ADMIN — ENOXAPARIN SODIUM 40 MG: 40 INJECTION SUBCUTANEOUS at 21:37

## 2021-02-08 RX ADMIN — VALPROIC ACID 250 MG: 250 CAPSULE, LIQUID FILLED ORAL at 18:34

## 2021-02-08 RX ADMIN — LORAZEPAM 0.5 MG: 2 INJECTION INTRAMUSCULAR; INTRAVENOUS at 13:42

## 2021-02-08 ASSESSMENT — ACTIVITIES OF DAILY LIVING (ADL)
ADLS_ACUITY_SCORE: 22

## 2021-02-08 NOTE — PROVIDER NOTIFICATION
MD Notification    Notified Person: MD    Notified Person Name:  HospitalistPretty     Notification Date/Time:  2/7 1830    Notification Interaction: text paged    Purpose of Notification: Pt has increasing BPs this afternoon and evening, do you want to add a PRN to her meds?      Orders Received: no new orders at this time      Comments:

## 2021-02-08 NOTE — PLAN OF CARE
"Pt oriented x4, lethargic. Pt states she feels \"irritable\" but is calm and cooperative. States she \"hurts all over.\" VSS on RA. Plan for chem dep and psych consults.  "

## 2021-02-08 NOTE — PLAN OF CARE
Shift Summary [id number]:    Neuro: A&O x2-3, disoriented to time and occasionally situation, slept most of the night.     Cardiac: SR, SBP remained <180 overnight. DBP elevated. +2 edema noted to BUE.    Respiratory: RA, LS clear    GI: regular diet, tolerating PO fluid    : Up to bedside commode x3    Integumentary: track marks noted on arms.     Activity: SBA, bed alarm on d/t impulsiveness.    COVID 19 Status: Swabbed on 2/6/21 at 2108, negative results.     D5NS+20mEQ K infusing at 75ml/hr. Awaiting CD evaluation. IMC status. Continue to monitor.     Addendum: This am pt is complaining of headache, states she believes she is beginning to withdraw. Scored 11 on CIWA.     Katelynn Barbosa RN

## 2021-02-08 NOTE — PROVIDER NOTIFICATION
MD Notification    Notified Person: MD    Notified Person Name:  Kristin     Notification Date/Time: 2/8 130    Notification Interaction: text page     Purpose of Notification: Pt starting to complain more about withdrawal symptoms. Very irritable, fidgety. Able to order suboxone?     Orders Received: Psych consult and ativan ordered.    Comments:

## 2021-02-08 NOTE — PLAN OF CARE
Neuro- A&Ox3, disoriented to time and occasionally situation, lethargic/sleepy  Most Recent Vitals- Temp: 98  F (36.7  C) Temp src: Oral BP: (!) 165/129 Pulse: 104   Resp: 14 SpO2: 97 % O2 Device: None (Room air)   Tele/Cardiac- SR/ST, denies CP  Resp- Stable on RA, LS clear  Activity- SBA  Pain- reports generalized body aches, PRN tylenol given  Drips- D5 + NS + 20mEq K @ 75, intermittent ABX  Drains/Tubes- PIV  Skin- bilateral track marks, BUE edema +1-2  GI/- WDL  Aggression Color- Green  COVID status- Negative  Plan- Continue to monitor, IMC status  Misc- NA    Rylie Daly, RN

## 2021-02-08 NOTE — PROGRESS NOTES
Cross Cover Note    Called re: rising BP. Noted clonidine on PTA list 0.1 mg TID.  Will order one dose of clonidine now. PRN Hydralazine for SBP >180.  Will need to work to confirm home meds in AM.    Marianna Green PA-C

## 2021-02-08 NOTE — PROGRESS NOTES
[facility]  Hospitalist Progress Note    Admit Date:  2/6/2021  Date of Service (when I saw the patient): 02/08/2021       Addendum: Patient continues to report anxiety related to opioid withdrawal.  I had extensive discussion with Pharm.D. who reviewed .  Patient last had Rx for Suboxone filled on 12/28/2020, 7-day supply.  [facility] policy indicates that a provider not waived by the DATA can prescribe Suboxone for relieving acute opioid withdrawal not to exceed 72 hours.  I will prescribe Suboxone x1 tonight, await CD evaluation and treatment tomorrow.    Please note that patient has a separate chart in Epic with MRN [id number] which contains much more data about her recent medical and chemical dependency history.  Patient has been seen in multiple ED's and hospitals ([facility], [facility], [facility]) in recent months either reporting withdrawal or requesting Suboxone.    Assessment & Plan   Wendy Fisher is a 35 year old female who was admitted on 2/6/2021 with decreased responsiveness .  She has a   longstanding IV drug use (methamphetamine and heroin), many overdoses--both intentional suicide attempts and accidental overdoses, multiple psychiatric diagnoses in the past including anxiety, depression, possible bipolar affective disorder, hypothyroidism who was admitted on 2/6/2021 with complications relating to heroin overdose.     Problem List:  Heroin overdose  Respiratory Arrest, secondary to above  Brought into ED with no respirations, thready pulse, hypothermic - per acquaintance had an overdose of unclear substance or amounts.  She responded well to narcan in ED (2 doses of 2 mg each) and abruptly sat up and began yawning, shivering and was tachycardic to 180s.  Her tachycardia is improving with supportive care      Does not require supplemental oxygen    Psychiatry consultation requested, please see Dr. Torres's evaluation and recommendations    Chemical dependency  evaluation requested, they anticipate completing on 2/9    CK not elevated     Hypothermia, resolved  On presentation had rectal temp 91 F. Presumed exposure to below zero temperature during course of drug use.     initial lactate 7.6 likely due to hypoperfusion - normalized on repeat testing and treatment with warmed with alejandrina hugger and blankets, warmed IV fluids     Fever and leukocytosis  Fever 100.9 after rewarming. WBC 19,000. CXR clear. Cathed UA specimen abnormal with +nitrites, +LE, 5 WBC, few bacteria. Possible stress leukocytosis in setting of respiratory arrest and overdose and fever after active rewarming techniques. At the time of admission she could not give reliable responses to whether she has urinary symptoms.      Urine culture is positive for > 100,000 colonies/ml E. Coli    Continue ceftriaxone for now    Blood cultures no growth so far     Recheck WBC     Hypoglycemia, mild    BS 72 ml/dl, changed IVF to D5NS      Chronic, stable problems:  Hypothyroidism: Doubt compliance prior to admission. Resume PTA levothyroxine once dose verified.     Ruled Out for COVID-19 infection  This patient was evaluated during a global COVID-19 pandemic, which necessitated consideration that the patient might be at risk for infection with the SARS-CoV-2 virus that causes COVID-19. Applicable protocols for evaluation were followed during the patient's care. Low suspicion for infection.   - NEGATIVE COVID-19 PCR test result    DVT prophylaxis: Lovenox  Code Status: Full     Total time spent:  > 35 minutes  Time spent counseling, coordination of care: 25 minutes including discussion with care team and PharmD, personal review and interpretation of labs and studies as noted above     Diet: Combination Diet Regular Diet Adult    DVT Prophylaxis: Enoxaparin (Lovenox) SQ  Kruse Catheter: not present  Code Status: Full Code      Disposition Plan   Expected discharge: Expected discharge in 1-2 days, when safe discharge plan  "identified    Entered: Kary Foster MD 02/08/2021, 8:29 AM     The patient's care was discussed with the Bedside Nurse and Patient.    Interval History   \"I'm just trying to sleep.\"  Patient is irritable, says she uses heroin daily, has used methamphetamine recently.  Reiterates that she was in a methadone program also was given Suboxone.  She says she, \"does not know what to do.\"  No new respiratory or GI complaints.    -Data reviewed today: I reviewed all new labs and imaging results over the last 24 hours. I personally reviewed no images or EKG's today.    Physical Exam   Temp: 98  F (36.7  C) Temp src: Oral BP: (!) 135/96 Pulse: 94   Resp: 12 SpO2: 100 % O2 Device: None (Room air)    Vitals:    02/08/21 0415   Weight: 57.7 kg (127 lb 3.2 oz)     Vital Signs with Ranges  Temp:  [98  F (36.7  C)-98.4  F (36.9  C)] 98  F (36.7  C)  Pulse:  [] 94  Resp:  [12-16] 12  BP: (123-165)/() 135/96  SpO2:  [96 %-100 %] 100 %  I/O last 3 completed shifts:  In: 2562.5 [P.O.:900; I.V.:1662.5]  Out: 4250 [Urine:4250]    GEN: Dozing, wakes to voice and quickly becomes alert.  CV:  regular, no loud murmur to ausc.  S1 + S2 noted, no S3 or S4.  LUNGS:  Clear to auscultation ant/lat/post bilaterally.  No rales/rhonchi/wheezing auscultated bilaterally.  No costal retractions bilaterally.  Symmetric chest rise on inhalation noted.  ABD:  Active bowel sounds, soft, non-tender/non-distended.  No rebound/guarding/rigidity.  No masses palpated.  No obvious HSM to exam.  SKIN: No rash on exposed skin  PSYCH:  Mood is irritable  NEURO:  No tremors at rest.  Moving all ext without clear focal difficulty or deficits.      Data   Labs:  Recent Labs   Lab 02/08/21  0623 02/07/21  0636 02/06/21  1908    142 142   POTASSIUM 3.8 4.0 4.8   CHLORIDE 107 112* 105   CO2 26 26 28   ANIONGAP 6 4 9   * 72 142*   BUN 6* 14 12   CR 0.62 0.77 1.13*   GFRESTIMATED >90 >90 62   GFRESTBLACK >90 >90 72   TANVIR 8.4* 8.0* 9.2     Recent " Labs   Lab 02/06/21  2108   COLOR Light Yellow   APPEARANCE Clear   URINEGLC 30*   URINEBILI Negative   URINEKETONE Negative   SG 1.007   UBLD Negative   URINEPH 6.0   PROTEIN Negative   NITRITE Positive*   LEUKEST Moderate*   RBCU 1   WBCU 5      HCG - negative  Recent Imaging:   No results found for this or any previous visit (from the past 24 hour(s)).    Medications     dextrose 5% and 0.9% NaCl with potassium chloride 20 mEq 75 mL/hr at 02/08/21 0807       cefTRIAXone  1 g Intravenous Q24H     enoxaparin ANTICOAGULANT  40 mg Subcutaneous Q24H     sodium chloride (PF)  3 mL Intracatheter Q8H

## 2021-02-08 NOTE — PROGRESS NOTES
A&O x4, very sleepy. VSS on room air. Tele SR/ST. Denies CP/SOB/pain. Up with standby assist. Per requesting suboxone, MD notified, ativan ordered. Pt transferred to [address]. All belongings sent.

## 2021-02-08 NOTE — PROVIDER NOTIFICATION
MD Notification    Notified Person: MD    Notified Person Name:  Kristin     Notification Date/Time: 2/8 1045    Notification Interaction: text page     Purpose of Notification: Able to discontinue IMC and transfer to medical floor? [facility], thanks!     Orders Received: IM orders discontinued, pt able to transfer.     Comments:'

## 2021-02-08 NOTE — CONSULTS
02/08/2021 chem dep consult acknowledged. Expect consult will be completed on 02/09/21 if pt is appropriate for interview.     ISMA Valdez  [email or web address]  [phone]

## 2021-02-09 LAB
AMPHETAMINES UR QL SCN: POSITIVE
BARBITURATES UR QL: NEGATIVE
BENZODIAZ UR QL: NEGATIVE
CANNABINOIDS UR QL SCN: NEGATIVE
COCAINE UR QL: NEGATIVE
OPIATES UR QL SCN: NEGATIVE
PCP UR QL SCN: NEGATIVE

## 2021-02-09 PROCEDURE — 250N000011 HC RX IP 250 OP 636: Performed by: INTERNAL MEDICINE

## 2021-02-09 PROCEDURE — 120N000001 HC R&B MED SURG/OB

## 2021-02-09 PROCEDURE — 250N000013 HC RX MED GY IP 250 OP 250 PS 637: Performed by: INTERNAL MEDICINE

## 2021-02-09 PROCEDURE — 80307 DRUG TEST PRSMV CHEM ANLYZR: CPT | Performed by: INTERNAL MEDICINE

## 2021-02-09 PROCEDURE — 99232 SBSQ HOSP IP/OBS MODERATE 35: CPT | Performed by: INTERNAL MEDICINE

## 2021-02-09 PROCEDURE — 99232 SBSQ HOSP IP/OBS MODERATE 35: CPT | Performed by: PSYCHIATRY & NEUROLOGY

## 2021-02-09 PROCEDURE — 250N000013 HC RX MED GY IP 250 OP 250 PS 637: Performed by: PSYCHIATRY & NEUROLOGY

## 2021-02-09 PROCEDURE — 999N000216 HC STATISTIC ADULT CD FACE TO FACE-NO CHRG

## 2021-02-09 RX ORDER — LEVOTHYROXINE SODIUM 112 UG/1
112 TABLET ORAL DAILY
Status: ON HOLD | COMMUNITY
End: 2021-02-10

## 2021-02-09 RX ORDER — NICOTINE 21 MG/24HR
1 PATCH, TRANSDERMAL 24 HOURS TRANSDERMAL DAILY
Status: DISCONTINUED | OUTPATIENT
Start: 2021-02-09 | End: 2021-02-10 | Stop reason: HOSPADM

## 2021-02-09 RX ORDER — LEVOTHYROXINE SODIUM 112 UG/1
112 TABLET ORAL DAILY
Status: DISCONTINUED | OUTPATIENT
Start: 2021-02-09 | End: 2021-02-10 | Stop reason: HOSPADM

## 2021-02-09 RX ORDER — CEFUROXIME AXETIL 500 MG/1
500 TABLET ORAL EVERY 12 HOURS SCHEDULED
Status: DISCONTINUED | OUTPATIENT
Start: 2021-02-09 | End: 2021-02-10 | Stop reason: HOSPADM

## 2021-02-09 RX ORDER — BUPRENORPHINE AND NALOXONE 4; 1 MG/1; MG/1
1 FILM, SOLUBLE BUCCAL; SUBLINGUAL 2 TIMES DAILY
Status: DISCONTINUED | OUTPATIENT
Start: 2021-02-09 | End: 2021-02-10 | Stop reason: HOSPADM

## 2021-02-09 RX ADMIN — VALPROIC ACID 250 MG: 250 CAPSULE, LIQUID FILLED ORAL at 07:50

## 2021-02-09 RX ADMIN — ENOXAPARIN SODIUM 40 MG: 40 INJECTION SUBCUTANEOUS at 20:12

## 2021-02-09 RX ADMIN — VALPROIC ACID 250 MG: 250 CAPSULE, LIQUID FILLED ORAL at 14:15

## 2021-02-09 RX ADMIN — CEFUROXIME AXETIL 500 MG: 500 TABLET ORAL at 09:35

## 2021-02-09 RX ADMIN — CLONIDINE HYDROCHLORIDE 0.1 MG: 0.1 TABLET ORAL at 07:50

## 2021-02-09 RX ADMIN — BUPRENORPHINE HYDROCHLORIDE, NALOXONE HYDROCHLORIDE 1 FILM: 4; 1 FILM, SOLUBLE BUCCAL; SUBLINGUAL at 20:12

## 2021-02-09 RX ADMIN — LEVOTHYROXINE SODIUM 112 MCG: 112 TABLET ORAL at 09:35

## 2021-02-09 RX ADMIN — CLONIDINE HYDROCHLORIDE 0.1 MG: 0.1 TABLET ORAL at 16:22

## 2021-02-09 RX ADMIN — NICOTINE 1 PATCH: 14 PATCH, EXTENDED RELEASE TRANSDERMAL at 17:28

## 2021-02-09 RX ADMIN — CLONIDINE HYDROCHLORIDE 0.1 MG: 0.1 TABLET ORAL at 00:03

## 2021-02-09 RX ADMIN — ACETAMINOPHEN 650 MG: 325 TABLET, FILM COATED ORAL at 00:14

## 2021-02-09 RX ADMIN — NICOTINE POLACRILEX 4 MG: 4 LOZENGE ORAL at 16:22

## 2021-02-09 RX ADMIN — CEFUROXIME AXETIL 500 MG: 500 TABLET ORAL at 20:11

## 2021-02-09 ASSESSMENT — ACTIVITIES OF DAILY LIVING (ADL)
ADLS_ACUITY_SCORE: 18
ADLS_ACUITY_SCORE: 22
ADLS_ACUITY_SCORE: 16
ADLS_ACUITY_SCORE: 18
ADLS_ACUITY_SCORE: 16
ADLS_ACUITY_SCORE: 18

## 2021-02-09 NOTE — PHARMACY-ADMISSION MEDICATION HISTORY
Pharmacy Medication History  Admission medication history interview status for the 2/6/2021  admission is complete. See EPIC admission navigator for prior to admission medications     Location of Interview: Phone  Medication history sources: Care Everywhere, patient interview    Significant changes made to the medication list:  Removed adderall, strattera, suboxone, fluoxetine, oral contraceptive, magic mouthwash, provigil, narcan nasal, naproxen, wellbutrin. Changed gabapentin.    In the past week, patient estimated taking medication this percent of the time: less than 50% due to being homeless    Additional medication history information:   She has been unable to take any meds for several weeks. She wants to go back on suboxone or methadone. Unreliable source of information.     Medication reconciliation completed by provider prior to medication history? no    Time spent in this activity: 40 minutes    Prior to Admission medications    Medication Sig Last Dose Taking? Auth Provider   cloNIDine (CATAPRES) 0.1 MG tablet Take 0.1 mg by mouth 3 times daily as needed (agitation, anxiety, craving)  More than a month at Unknown time  Unknown, Entered By History   gabapentin (NEURONTIN) 300 MG capsule Take 300 mg by mouth 2 times daily  More than a month at Unknown time  Unknown, Entered By History   levothyroxine (SYNTHROID/LEVOTHROID) 112 MCG tablet Take 112 mcg by mouth daily More than a month at Unknown time  Unknown, Entered By History       The information provided in this note is only as accurate as the sources available at the time of update(s)

## 2021-02-09 NOTE — PLAN OF CARE
Summary:  Overdose, hx of depression, anxiety  DATE & TIME: 2/8/21-2/9/21 [id number]  Cognitive Concerns/ Orientation : A/O x4, sleepy  BEHAVIOR & AGGRESSION TOOL COLOR: green, cooperative  CIWA SCORE: 2, 1.   ABNL VS/O2: VSS on RA  MOBILITY: SBA to BR  PAIN MANAGMENT: denies  DIET: Reg  BOWEL/BLADDER: continent of urine, no BM  ABNL LAB/BG: NA  DRAIN/DEVICES: PIV SL  TELEMETRY RHYTHM: none  SKIN: dry/bruised, BUE track marks  TESTS/PROCEDURES: Psych/CD consults placed  D/C DAY/GOALS/PLACE: pending consults  OTHER IMPORTANT INFO: Pt continues on Rocephin, Depakote, Ativan prn d/'d, Psych to follow up in am.

## 2021-02-09 NOTE — CONSULTS
Date: 2/9/2021     This counselor talked with this patient on 2/9/2021 at 12:20 pm via her bedside phone, [phone].  The patient was asked if she was interested in entering a substance use disorder treatment program at any level at this time.  The patient stated she was living at a sober house in [place], [place] and her plan was to return to living at the sober house, so she was not interested in entering a substance use disorder treatment program at any level of care at this time.  This counselor asked the patient if she would be allowed to return to living at the sober house given her recent relapse with methamphetamine and heroin as well as her accidental drug overdose which had led up to her current hospital admission on [address] at [facility], but she stated she was confident she would be allowed to return to living at her current sober house.  The patient was informed the IP substance use disorder consult would be closed at this time, but could be re-ordered as an IP consult by her medical providers on [address] as needed if she changed her mind about wanting to enter a substance use disorder treatment program or she could be scheduled for an outpatient substance use disorder assessment as needed by calling the [facility] Intake department at [phone] to set up an outpatient substance use disorder assessment appointment.     For reference:  The following facilities offer substance use disorder assessments for patients with an active funding source -      [facility]  Tel #: [phone]    [facility]  Multiple Locations  Tel #: [phone]    [facility]   Multiple Locations  Tel #: [phone]    [facility]   Phone: [phone]  Fax: [phone]  or for walk in assessment go to;   [address] [place] [address] Monday thru Friday, 7:00am to 2:15pm  [address], [place] [address]  Saturday s, 7:45am to 10:45am  Arrive early for best chance to be seen early    [facility]   Phone [phone]   Fax [phone]    YUNIOR Rose, [facility]  CD Evaluation Counselor  [address] [address]  [email or web address]  Tel: [phone]  Fax: [phone]  Gender pronouns: he/him  Employed by: [employer]

## 2021-02-09 NOTE — PLAN OF CARE
Summary:  Overdose, hx of depression, anxiety  DATE & TIME: 2/8 ellis  Cognitive Concerns/ Orientation : A/O x4, sleepy, anxious/restless when awake  BEHAVIOR & AGGRESSION TOOL COLOR: green, cooperative  CIWA SCORE: 8/8/2/1  ABNL VS/O2: VSS on RA  MOBILITY: SBA to BR  PAIN MANAGMENT: denies  DIET: Reg, fair po  BOWEL/BLADDER: continent of urine, no BM  ABNL LAB/BG: none  DRAIN/DEVICES: PIV SL  TELEMETRY RHYTHM: none  SKIN: dry/bruised, BUE track marks  TESTS/PROCEDURES: Psych/CD consults placed  D/C DAY/GOALS/PLACE: pending  OTHER IMPORTANT INFO: Pt received dose of Suboxone tonight, continues on Rocephin, Depakote, Ativan prn d/'d, Psych to follow up in am.

## 2021-02-09 NOTE — PLAN OF CARE
Summary:  Overdose, hx of depression, anxiety  DATE & TIME: 2/9/2021 1600  Cognitive Concerns/ Orientation : A/O x4, sleeping off and on most of day.  BEHAVIOR & AGGRESSION TOOL COLOR: green, cooperative  CIWA SCORE: 2 & 2 & 5. Mother reported that pt told her she was seeing bugs and hearing static and evil spirits. When pt was questioned, she told me she is seeing tiny black specs intermittently and denied auditory hallucinations.   ABNL VS/O2:HR occ tachy, other VSS on RA  MOBILITY: SBA to BR  PAIN MANAGMENT: Back ache, generalized pain, right mid/lower abd discomfort. Rates pain generally at a 5- 7 but has not requested pain medication.  DIET: Reg  BOWEL/BLADDER: continent of urine, urine dark donovan, cloudy. no BM  ABNL LAB/BG: Drug tox: positive for amphetamines.  DRAIN/DEVICES: PIV SL  TELEMETRY RHYTHM: none  SKIN: dry/bruised, BUE track marks  TESTS/PROCEDURES: Psych/CD consults placed  D/C DAY/GOALS/PLACE: pending consults  OTHER IMPORTANT INFO: Pt continues on Rocephin, Depakote, Ativan prn. Started Ceftin po for UTI. Edema tobias hands and wrists, has numbness and tingling in hands.

## 2021-02-09 NOTE — CONSULTS
[facility]  Psychiatry Consultation - Follow-up note      Interim History:   Follow-up requested today to assist in treating opiate use disorder.  The patient was provided with a one-time dose of Suboxone yesterday to help minimize heroin withdrawal symptoms.  The patient was initially evaluated by Dr. Torres on February 7.    On examination today, the patient appears much more alert from what was described in the prior consultation note.  She adamantly denied any suicide with her recent heroin overdose.  She expressed a desire for sobriety and is familiar with using Suboxone in the past although not formally prescribed the medication for long-term treatment.  When Suboxone was previously prescribed to her while in detox, she gained the most benefit at a dose of 12 mg daily.    She was ambivalent regarding pursuing substance use disorder treatment although does desire sobriety.  She would be open to exploring what treatment options are available to her as well as what may be recommended to help improve her odds of achieving and maintaining sobriety.    Upon discharge from the hospital, she anticipates either staying with her mother or a male friend.  Her mother showed up at some point in the interview and expressed disagreement with her consideration of going to stay with her male friend and expressed preference for the patient to come stay with her.    The patient was receptive regarding treatment interventions as well as information related to [place]'s substance use disorder treatment clinic.    She denied suicidal and homicidal thoughts.  She denied psychotic symptoms.  No symptoms corresponding to a depressive disorder or significant anxiety reported today.           Medications:       cefuroxime  500 mg Oral Q12H JIN     cloNIDine  0.1 mg Oral Q8H     enoxaparin ANTICOAGULANT  40 mg Subcutaneous Q24H     levothyroxine  112 mcg Oral Daily     nicotine  1 patch Transdermal Daily      nicotine   Transdermal Q8H     sodium chloride (PF)  3 mL Intracatheter Q8H     valproic acid  250 mg Oral BID          Allergies:   Not on File       Labs:     Recent Results (from the past 24 hour(s))   Drug abuse screen 77 urine ([place], [facility], [facility])    Collection Time: 02/09/21  6:30 AM   Result Value Ref Range    Amphetamine Qual Urine Positive (A) NEG^Negative    Barbiturates Qual Urine Negative NEG^Negative    Benzodiazepine Qual Urine Negative NEG^Negative    Cannabinoids Qual Urine Negative NEG^Negative    Cocaine Qual Urine Negative NEG^Negative    Opiates Qualitative Urine Negative NEG^Negative    PCP Qual Urine Negative NEG^Negative          Psychiatric Examination:     /73 (BP Location: Left arm)   Pulse 78   Temp 97.8  F (36.6  C) (Oral)   Resp 16   Wt 57.7 kg (127 lb 3.2 oz)   SpO2 98%   Weight is 127 lbs 3.2 oz  There is no height or weight on file to calculate BMI.  Orthostatic Vitals     None            Appearance: awake, alert  Attitude:  cooperative  Eye Contact:  fair  Mood:  better  Affect:  appropriate and in normal range  Speech:  clear, coherent  Psychomotor Behavior:  no evidence of tardive dyskinesia, dystonia, or tics  Throught Process:  linear  Associations:  no loose associations  Thought Content:  no evidence of suicidal ideation or homicidal ideation and no evidence of psychotic thought  Insight:  fair  Judgement:  intact  Oriented to:  time, person, and place  Attention Span and Concentration:  intact  Recent and Remote Memory:  intact           DIagnoses:     Opiate use disorder, severe  Opiate withdrawal  Respiratory failure secondary to unintentional heroin overdose      Recommendations:     Suboxone may be initiated at a dose of 4 mg twice a day for treatment of an opiate use disorder.  When the discharge date has been identified for the patient, please page me to assist in providing her with a prescription of Suboxone to bridge her until her outpatient appointment.    The  patient was educated regarding how to seek substance use disorder treatment through [place] at the [facility].  I will place an order requesting that an aftercare appointment be made for her for Suboxone maintenance treatment.  She may utilize the recovery clinic for walk-in services for a bridging prescription of Suboxone until her appointment with the addiction medicine clinic.      Please include the following information in her discharge paperwork/AVS:    [facility] (Suboxone initiation and bridging appointments)  [facility] [address], [address]   [place], [place], [address]  Phone: [phone]  Fax: [phone]    [facility] Mon, Wed, Fridays  9:00am-4:00pm  Walk in hours: 9am-3pm  ________________    [facility]   [address] [place] [address], [address]  [place] [address]  Ph: [phone]  Fax: [phone]  ___________________    No additional psychotropic medications indicated at this time.  Chemical dependency consultation has been requested to aid the patient in exploring residential and outpatient treatment options.  Once the patient is determined to be medically stable, she may be discharged home.    Please reconsult with Psychiatry as needed.   Tano Hernandez MD   Text Page

## 2021-02-09 NOTE — PROGRESS NOTES
[facility]  Hospitalist Progress Note    Admit Date:  2/6/2021  Date of Service (when I saw the patient): 02/09/2021       Please note that patient has a separate chart in Epic with MRN [id number] which contains much more data about her recent medical and chemical dependency history.  Patient has been seen in multiple ED's and hospitals ([facility], [facility], [facility]) in recent months either reporting withdrawal or requesting Suboxone.    Assessment & Plan   Wendy Fisher is a 35 year old female who was admitted on 2/6/2021 with decreased responsiveness .  She has a   longstanding IV drug use (methamphetamine and heroin), many overdoses--both intentional suicide attempts and accidental overdoses, multiple psychiatric diagnoses in the past including anxiety, depression, possible bipolar affective disorder, hypothyroidism who was admitted on 2/6/2021 with complications relating to heroin overdose.     Problem List:  Heroin overdose  Respiratory Arrest, secondary to above  Severe chronic opioid dependence, complicated  Methamphetamine abuse, by history  Brought into ED with no respirations, thready pulse, hypothermic - per acquaintance had an overdose of unclear substance or amounts.  She responded well to narcan in ED (2 doses of 2 mg each) and abruptly sat up and began yawning, shivering and was tachycardic to 180s.  Her tachycardia is improving with supportive care      Does not require supplemental oxygen    Psychiatry consultation requested, please see Dr. Torres's evaluation and recommendations    Chemical dependency evaluation requested, they anticipate completing on 2/9    CK not elevated    UDS not done on admission; UDS from 2/9 a.m. is positive for methamphetamine, negative for opioid    Patient continued to report anxiety related to opioid withdrawal.  I had extensive discussion with Pharm.D. who reviewed .  Patient last had Rx for Suboxone filled on 12/28/2020, 7-day  supply. [place] policy indicates that a provider not waived by DATA can prescribe Suboxone for relieving acute opioid withdrawal not to exceed 72 hours.  I prescribed Suboxone x1 on 2/8, await CD evaluation and treatment, psychiatry re-consult 2/9.    Hypothermia, resolved  On presentation had rectal temp 91 F. Presumed exposure to below zero temperature during course of drug use.     initial lactate 7.6 likely due to hypoperfusion - normalized on repeat testing and treatment with warmed with alejandrina hugger and blankets, warmed IV fluids     Fever and leukocytosis  Fever 100.9 after rewarming. WBC 19,000. CXR clear. Cathed UA specimen abnormal with +nitrites, +LE, 5 WBC, few bacteria. Possible stress leukocytosis in setting of respiratory arrest and overdose and fever after active rewarming techniques. At the time of admission she could not give reliable responses to whether she has urinary symptoms.      Urine culture is positive for > 100,000 colonies/ml E. Coli, sensitive to all abx tested except Ampicillin and FQs    Discontinue Ceftriaxone, begin Cefin    Blood cultures no growth      Hypoglycemia, mild    BS 72 ml/dl, changed IVF to D5NS    IVF completed      Chronic, stable problems:  Hypothyroidism: Doubt compliance prior to admission. Resume PTA levothyroxine      Ruled Out for COVID-19 infection  This patient was evaluated during a global COVID-19 pandemic, which necessitated consideration that the patient might be at risk for infection with the SARS-CoV-2 virus that causes COVID-19. Applicable protocols for evaluation were followed during the patient's care. Low suspicion for infection.   - NEGATIVE COVID-19 PCR test result    DVT prophylaxis: Lovenox  Code Status: Full       Diet: Combination Diet Regular Diet Adult    DVT Prophylaxis: Enoxaparin (Lovenox) SQ  Kruse Catheter: not present  Code Status: Full Code      Disposition Plan   Expected discharge: Expected discharge tomorrow, when psychiatry consult  "is complete, +/- Suboxone program, and safe discharge plan has been identified    Entered: Kary Foster MD 02/09/2021, 8:49 AM     The patient's care was discussed with the Bedside Nurse and Patient.    Interval History   \" Fine.\"  Patient gives one-word answers to questions, picks at her meal, does not seem to wish to engage with examiner.  We discussed her desire to have a consistent source for her Suboxone and to maintain sobriety.  She says she was not able to stay with her prior program in [place] because she did not have housing in [place], she now has safe housing.    -Data reviewed today: I reviewed all new labs and imaging results over the last 24 hours. I personally reviewed no images or EKG's today.    Physical Exam   Temp: 98.2  F (36.8  C) Temp src: Oral BP: 124/79 Pulse: 90   Resp: 16 SpO2: 98 % O2 Device: None (Room air)    Vitals:    02/08/21 0415   Weight: 57.7 kg (127 lb 3.2 oz)     Vital Signs with Ranges  Temp:  [98  F (36.7  C)-98.8  F (37.1  C)] 98.2  F (36.8  C)  Pulse:  [] 90  Resp:  [16-20] 16  BP: (113-131)/(67-90) 124/79  SpO2:  [96 %-98 %] 98 %  I/O last 3 completed shifts:  In: 680 [P.O.:680]  Out: 1900 [Urine:1900]    GEN: Awake, alert.  Does not look distressed.  CV:  regular, no loud murmur to ausc.  S1 + S2 noted, no S3 or S4.  LUNGS:  Clear to auscultation ant/lat/post bilaterally.  No rales/rhonchi/wheezing auscultated bilaterally.  No costal retractions bilaterally.  Symmetric chest rise on inhalation noted.  ABD:  Active bowel sounds, soft, non-tender/non-distended.  No rebound/guarding/rigidity.  No masses palpated.  No obvious HSM to exam.  SKIN: No rash on exposed skin  PSYCH:  Mood is somewhat withdrawn, avoidant  NEURO:  No tremor.  Moves both arms and both legs.    Data   Labs:  Recent Labs   Lab 02/08/21 0623 02/07/21  0636 02/06/21  1908    142 142   POTASSIUM 3.8 4.0 4.8   CHLORIDE 107 112* 105   CO2 26 26 28   ANIONGAP 6 4 9   * 72 142*   BUN 6* 14 " 12   CR 0.62 0.77 1.13*   GFRESTIMATED >90 >90 62   GFRESTBLACK >90 >90 72   TANVIR 8.4* 8.0* 9.2     Recent Labs   Lab 02/06/21  2108   COLOR Light Yellow   APPEARANCE Clear   URINEGLC 30*   URINEBILI Negative   URINEKETONE Negative   SG 1.007   UBLD Negative   URINEPH 6.0   PROTEIN Negative   NITRITE Positive*   LEUKEST Moderate*   RBCU 1   WBCU 5      HCG - negative  Recent Imaging:   No results found for this or any previous visit (from the past 24 hour(s)).    Medications       cefTRIAXone  1 g Intravenous Q24H     cloNIDine  0.1 mg Oral Q8H     enoxaparin ANTICOAGULANT  40 mg Subcutaneous Q24H     sodium chloride (PF)  3 mL Intracatheter Q8H     valproic acid  250 mg Oral BID

## 2021-02-10 ENCOUNTER — TELEPHONE (OUTPATIENT)
Dept: BEHAVIORAL HEALTH | Facility: CLINIC | Age: 36
End: 2021-02-10

## 2021-02-10 VITALS
HEART RATE: 83 BPM | OXYGEN SATURATION: 98 % | WEIGHT: 127.2 LBS | DIASTOLIC BLOOD PRESSURE: 71 MMHG | SYSTOLIC BLOOD PRESSURE: 117 MMHG | TEMPERATURE: 98.2 F | RESPIRATION RATE: 20 BRPM

## 2021-02-10 PROCEDURE — 99239 HOSP IP/OBS DSCHRG MGMT >30: CPT | Performed by: INTERNAL MEDICINE

## 2021-02-10 PROCEDURE — 250N000013 HC RX MED GY IP 250 OP 250 PS 637: Performed by: INTERNAL MEDICINE

## 2021-02-10 PROCEDURE — 250N000013 HC RX MED GY IP 250 OP 250 PS 637: Performed by: PSYCHIATRY & NEUROLOGY

## 2021-02-10 PROCEDURE — 250N000011 HC RX IP 250 OP 636: Performed by: INTERNAL MEDICINE

## 2021-02-10 RX ORDER — GABAPENTIN 300 MG/1
300 CAPSULE ORAL 2 TIMES DAILY
Qty: 60 CAPSULE | Refills: 0 | Status: SHIPPED | OUTPATIENT
Start: 2021-02-10

## 2021-02-10 RX ORDER — CEFUROXIME AXETIL 500 MG/1
500 TABLET ORAL EVERY 12 HOURS
Qty: 6 TABLET | Refills: 0 | Status: SHIPPED | OUTPATIENT
Start: 2021-02-10 | End: 2021-02-13

## 2021-02-10 RX ORDER — CLONIDINE HYDROCHLORIDE 0.1 MG/1
0.1 TABLET ORAL 3 TIMES DAILY PRN
Qty: 90 TABLET | Refills: 0 | Status: SHIPPED | OUTPATIENT
Start: 2021-02-10

## 2021-02-10 RX ORDER — LEVOTHYROXINE SODIUM 112 UG/1
112 TABLET ORAL DAILY
Qty: 30 TABLET | Refills: 0 | Status: SHIPPED | OUTPATIENT
Start: 2021-02-10

## 2021-02-10 RX ORDER — BUPRENORPHINE AND NALOXONE 4; 1 MG/1; MG/1
1 FILM, SOLUBLE BUCCAL; SUBLINGUAL 2 TIMES DAILY
Qty: 2 FILM | Refills: 0 | Status: SHIPPED | OUTPATIENT
Start: 2021-02-10

## 2021-02-10 RX ADMIN — CLONIDINE HYDROCHLORIDE 0.1 MG: 0.1 TABLET ORAL at 00:29

## 2021-02-10 RX ADMIN — CLONIDINE HYDROCHLORIDE 0.1 MG: 0.1 TABLET ORAL at 08:41

## 2021-02-10 RX ADMIN — ONDANSETRON 4 MG: 2 INJECTION INTRAMUSCULAR; INTRAVENOUS at 00:36

## 2021-02-10 RX ADMIN — Medication 2 DROP: at 00:29

## 2021-02-10 RX ADMIN — NICOTINE 1 PATCH: 14 PATCH, EXTENDED RELEASE TRANSDERMAL at 08:44

## 2021-02-10 RX ADMIN — CEFUROXIME AXETIL 500 MG: 500 TABLET ORAL at 08:41

## 2021-02-10 RX ADMIN — LEVOTHYROXINE SODIUM 112 MCG: 112 TABLET ORAL at 06:42

## 2021-02-10 RX ADMIN — VALPROIC ACID 250 MG: 250 CAPSULE, LIQUID FILLED ORAL at 08:41

## 2021-02-10 RX ADMIN — BUPRENORPHINE HYDROCHLORIDE, NALOXONE HYDROCHLORIDE 1 FILM: 4; 1 FILM, SOLUBLE BUCCAL; SUBLINGUAL at 09:55

## 2021-02-10 ASSESSMENT — ACTIVITIES OF DAILY LIVING (ADL)
ADLS_ACUITY_SCORE: 16

## 2021-02-10 NOTE — PLAN OF CARE
Cognitive Concerns/ Orientation : Oriented x 4  BEHAVIOR & AGGRESSION TOOL COLOR: Green.   CIWA: 2  ABNL VS/O2: VSS at room air  MOBILITY: Independent, able to move around. Generalized weakness   PAIN MANAGMENT: No pain stated  DIET: Regular diet  BOWEL/BLADDER: Continent. Last BM was yesterday. Bowel sounds normoactive and audible.   ABNL LAB/BG: No new labs today  DRAIN/DEVICES: PIV removed prior to D/C  TELEMETRY RHYTHM: N/A  SKIN: Skin is warm with no discoloration. There is some bruising where there are tracks bilaterally on forearms. Also, bilateral edema on forearms and hands  TESTS/PROCEDURES: N/A  D/C DAY/GOALS/PLACE: Discharge home with family  OTHER IMPORTANT INFO:

## 2021-02-10 NOTE — DISCHARGE SUMMARY
[facility]  Hospitalist Discharge Summary      Date of Admission:  2/6/2021  Date of Discharge:  2/10/2021  Discharging Provider: Kary Foster MD      Discharge Diagnoses   (Presumed) Heroin overdose  Respiratory Arrest, secondary to above  Severe chronic opioid dependence, complicated  Methamphetamine abuse, by history  Hypothermia due to opioid overdose, resolved  E. coli urinary tract infection  Mild hypoglycemia, possibly due to inadequate oral intake, resolved    Follow-ups Needed After Discharge   Follow-up Appointments     Follow-up and recommended labs and tests       [facility] by [facility]ry.             Unresulted Labs Ordered in the Past 30 Days of this Admission     Date and Time Order Name Status Description    2/6/2021 2057 Blood culture Preliminary     2/6/2021 2057 Blood culture Preliminary           Discharge Disposition   Discharged to home  Condition at discharge: Stable    Hospital Course   Please note that patient has a separate chart in Epic with MRN [id number] which contains much more data about her recent medical and chemical dependency history.  Patient has been seen in multiple ED's and hospitals ([facility], [facility], [facility]) in recent months either reporting withdrawal or requesting Suboxone.        Assessment & Plan     Wendy Fisher is a 35 year old female who was admitted on 2/6/2021 with decreased responsiveness .  She has a   longstanding IV drug use (methamphetamine and heroin), many overdoses--both intentional suicide attempts and accidental overdoses, multiple psychiatric diagnoses in the past including anxiety, depression, possible bipolar affective disorder, hypothyroidism who was admitted on 2/6/2021 with complications relating to heroin overdose.      Problem List:  Heroin overdose  Respiratory Arrest, secondary to above  Severe chronic opioid dependence, complicated  Methamphetamine abuse, by  "history  Brought into ED with no respirations, thready pulse, hypothermic - per acquaintance had an overdose of unclear substance or amounts.  She responded well to narcan in ED (2 doses of 2 mg each) and abruptly sat up and began yawning, shivering and was tachycardic to 180s.  Her tachycardia is improving with supportive care     ? Does not require supplemental oxygen  ? Psychiatry consultation requested, please see Dr. Torres's evaluation and recommendations  ? Chemical dependency evaluation requested.  They conclude, \"The patient stated she was living at a sober house in [place], [place] and [facility] so she was not interested in entering a substance use disorder treatment program at any level of care at this time.\"  ? CK not elevated  ? UDS not done on admission; UDS from 2/9 a.m. is positive for methamphetamine, negative for opioid  ? Patient continued to report anxiety related to opioid withdrawal.  I had extensive discussion with Pharm.D. who reviewed .  Patient last had Rx for Suboxone filled on 12/28/2020, 7-day supply. [place] policy indicates that a provider not waived by DATA can prescribe Suboxone for relieving acute opioid withdrawal not to exceed 72 hours.  I prescribed Suboxone x1 on 2/8  ? Patient was seen in follow-up by Dr. Hernandez of psychiatry who recommends initiating Suboxone at 4 mg twice daily for treatment of opiate use disorder.  He did provide her with a prescription for Suboxone to bridge her until her outpatient appointment at [place]'s addiction medicine clinic at [facility]  ? He further concludes, \"she may utilize the recovery clinic for walk-in services for a bridging prescription of Suboxone until her appointment with the addiction medicine clinic.\"     Hypothermia, resolved  On presentation had rectal temp 91 F. Presumed exposure to below zero temperature during course of drug use.   ? initial lactate 7.6 likely due to " hypoperfusion - normalized on repeat testing and treatment with warmed with alejandrina hugger and blankets, warmed IV fluids     Fever and leukocytosis  Fever 100.9 after rewarming. WBC 19,000. CXR clear. Cathed UA specimen abnormal with +nitrites, +LE, 5 WBC, few bacteria. Possible stress leukocytosis in setting of respiratory arrest and overdose and fever after active rewarming techniques. At the time of admission she could not give reliable responses to whether she has urinary symptoms.    ? Urine culture is positive for > 100,000 colonies/ml E. Coli, sensitive to all abx tested except Ampicillin and FQs  ? Discontinue Ceftriaxone, begin Cefin  ? Blood cultures no growth      Hypoglycemia, mild  ? BS 72 ml/dl, changed IVF to D5NS  ? IVF completed        Chronic, stable problems:  Hypothyroidism: Doubt compliance prior to admission. Resume PTA levothyroxine      Ruled Out for COVID-19 infection  This patient was evaluated during a global COVID-19 pandemic, which necessitated consideration that the patient might be at risk for infection with the SARS-CoV-2 virus that causes COVID-19. Applicable protocols for evaluation were followed during the patient's care. Low suspicion for infection.   - NEGATIVE COVID-19 PCR test result    Consultations This Hospital Stay   PSYCHIATRY IP CONSULT  CARE MANAGEMENT / SOCIAL WORK IP CONSULT  CHEMICAL DEPENDENCY IP CONSULT  PSYCHIATRY IP CONSULT  PSYCHIATRY IP CONSULT   REVIEW IP PHARMACY CONSULT  CHEMICAL DEPENDENCY IP CONSULT    Code Status   Full Code    Time Spent on this Encounter   I, Kary Foster MD, personally saw the patient today and spent greater than 30 minutes discharging this patient.       Kary Foster MD  [address] [address] [address]  [facility] [place] [address]  Phone: [phone]  ______________________________________________________________________    Physical Exam   Vital Signs: Temp: 98.2  F (36.8  C) Temp src: Oral  BP: 117/71 Pulse: 83   Resp: 20 SpO2: 98 % O2 Device: None (Room air)    Weight: 127 lbs 3.2 oz  I saw and examined the patient on the date of discharge.           Primary Care Physician   Physician No Ref-Primary    Discharge Orders      MENTAL HEALTH REFERRAL  - Adult; Addiction Medicine Provider; Addiction Medicine Evaluation & Treatment; Addiction Medicine Consultation, Evaluation & Treatment [phone]; Opioids; Medication Assisted Treatment: Opioids; We will contact you t...      Reason for your hospital stay    You were admitted after a drug overdose.     Follow-up and recommended labs and tests     [facility] as instructed by psychiatry.     Activity    Your activity upon discharge: activity as tolerated     Diet    Follow this diet upon discharge: Orders Placed This Encounter      Combination Diet Regular Diet Adult       Significant Results and Procedures   Most Recent 3 CBC's:  Recent Labs   Lab Test 02/08/21 0623 02/07/21 0636 02/06/21  1908   WBC 7.0 13.7* 19.0*   HGB 13.1 11.9 15.7   MCV 91 92 95    187 248     Most Recent 3 BMP's:  Recent Labs   Lab Test 02/08/21 0623 02/07/21  0636 02/06/21  1908    142 142   POTASSIUM 3.8 4.0 4.8   CHLORIDE 107 112* 105   CO2 26 26 28   BUN 6* 14 12   CR 0.62 0.77 1.13*   ANIONGAP 6 4 9   TANVIR 8.4* 8.0* 9.2   * 72 142*     Most Recent 6 Bacteria Isolates From Any Culture (See EPIC Reports for Culture Details):  Recent Labs   Lab Test 02/06/21 2108 02/06/21 2102   CULT No growth after 4 days  >100,000 colonies/mL  Escherichia coli  * No growth after 4 days   ,   Results for orders placed or performed during the hospital encounter of 02/06/21   XR Chest Port 1 View    Narrative    CHEST ONE VIEW  2/6/2021 9:31 PM     HISTORY: Fever.    COMPARISON: None.      Impression    IMPRESSION: No acute disease.    ESTELLA WILLINGHAM MD     Discharge Medications   Current Discharge Medication List      START taking these medications     Details   cefuroxime (CEFTIN) 500 MG tablet Take 1 tablet (500 mg) by mouth every 12 hours for 3 days  Qty: 6 tablet, Refills: 0    Associated Diagnoses: Acute cystitis without hematuria         CONTINUE these medications which have CHANGED    Details   cloNIDine (CATAPRES) 0.1 MG tablet Take 1 tablet (0.1 mg) by mouth 3 times daily as needed (agitation, anxiety, craving)  Qty: 90 tablet, Refills: 0    Associated Diagnoses: Opioid dependence with withdrawal (H)      gabapentin (NEURONTIN) 300 MG capsule Take 1 capsule (300 mg) by mouth 2 times daily  Qty: 60 capsule, Refills: 0    Associated Diagnoses: Opioid dependence with withdrawal (H)      levothyroxine (SYNTHROID/LEVOTHROID) 112 MCG tablet Take 1 tablet (112 mcg) by mouth daily  Qty: 30 tablet, Refills: 0    Associated Diagnoses: Hypothyroidism, unspecified type           Allergies   Not on File

## 2021-02-10 NOTE — CONSULTS
Care Management Initial Consult    General Information  Assessment completed with: Patient,    Type of CM/SW Visit: Offer D/C Planning    Primary Care Provider verified and updated as needed: Yes   Readmission within the last 30 days: no previous admission in last 30 days      Reason for Consult: care coordination/care conference, discharge planning, medication concerns, substance use concerns  Advance Care Planning:   None          Communication Assessment  Patient's communication style: spoken language (English or Bilingual)    Hearing Difficulty or Deaf: no   Wear Glasses or Blind: no    Cognitive  Cognitive/Neuro/Behavioral: WDL  Level of Consciousness: alert  Arousal Level: arouses to voice  Orientation: oriented x 4  Mood/Behavior: calm, cooperative  Best Language: 0 - No aphasia  Speech: clear, spontaneous    Living Environment:   People in home: alone     Current living Arrangements: house      Able to return to prior arrangements: yes       Family/Social Support:  Care provided by: self  Provides care for:    Marital Status: Single  Parent(s)          Description of Support System: Involved    Support Assessment: Adequate family and caregiver support    Current Resources:   Patient receiving home care services: No     Community Resources:    Equipment currently used at home: none  Supplies currently used at home:      Employment/Financial:  Employment Status: unemployed        Financial Concerns: No concerns identif        Socioeconomic History     Marital status:      Spouse name: Not on file     Number of children: Not on file     Years of education: Not on file     Highest education level: Not on file          Functional Status:  Prior to admission patient needed assistance:   Dependent ADLs:: Independent          Mental Health Status:  Mental Health Status: Current Concern  Mental Health Management: Psychiatrist    Chemical Dependency Status:  Chemical Dependency Status: Current Concern  Chemical  Dependency Management: Other (see comment)(suboxone)          Values/Beliefs:  Spiritual, Cultural Beliefs, Adventism Practices, Values that affect care: no               Care Management Discharge Note    Discharge Date: 02/12/21       Discharge Disposition: Patients mothers home     Discharge Services: Appointments completed    Discharge DME:  none     Discharge Transportation:  Family    Private pay costs discussed: Not applicable    PAS Confirmation Code:    Patient/family educated on Medicare website which has current facility and service quality ratings:  NA     Education Provided on the Discharge Plan: Yes   Persons Notified of Discharge Plans: None. Patient notified her mother  Patient/Family in Agreement with the Plan:  yes    Handoff Referral Completed: Yes faxed information to [facility]    Additional Information:  Discharge plans discussed with patient. Patient stating she will live with her Mother who is willing to accept patient in her home. Patient had long discussion with psychiatrist.  Patient was  ambivalent regarding pursuing substance use disorder treatment although does desire sobriety.  She would be open to exploring what treatment options are available to her as well as what may be recommended to help improve her odds of achieving and maintaining sobriety. Patient has information on facilities  That offer substance use disorder  assessment.  Appointment with Suboxone clinic and primary care  Made.        .b

## 2021-02-10 NOTE — PLAN OF CARE
Cognitive Concerns/ Orientation : A/O x4, drowsy/withdrawn.   BEHAVIOR & AGGRESSION TOOL COLOR: green, cooperative  CIWA SCORE: 1,5, 1  ABNL VS/O2:  HR occ tachy, other VSS on RA  MOBILITY: SBA to BR  PAIN MANAGMENT: Back ache, generalized pain, right mid/lower abd discomfort.  DIET: Reg, good appetite  BOWEL/BLADDER: continent of urine, urine dark donovan, cloudy. no BM  ABNL LAB/BG: Drug tox: positive for amphetamines.  DRAIN/DEVICES: PIV SL   TELEMETRY RHYTHM: none  SKIN: dry/bruised, BUE track marks  TESTS/PROCEDURES: Psych/CD saw patient today. Recommendations in notes.   D/C DAY/GOALS/PLACE: Pending   OTHER IMPORTANT INFO: Started Ceftin po for UTI. Started suboxone this evening per psych recommendation. Edema tobias hands and wrists, has numbness and tingling in hands. Zofran given x1 for nausea, eyedrops given for comfort.

## 2021-02-10 NOTE — TELEPHONE ENCOUNTER
Attempted to reach pt to give her information about [facility], discuss discharge buprenorphine status.  The number listed for pt is her mother, who answered the phone.  Pt's mother stated pt does not have a phone.  I gave pt's mother my contact information [phone] and requested call back from pt.      M [address] [address]    Phone: [phone]    Hours: Monday, Wednesday, Friday 8:30a-4:30p

## 2021-02-12 LAB
BACTERIA SPEC CULT: NO GROWTH
BACTERIA SPEC CULT: NO GROWTH
SPECIMEN SOURCE: NORMAL
SPECIMEN SOURCE: NORMAL

## 2021-07-04 ENCOUNTER — HOSPITAL ENCOUNTER (EMERGENCY)
Facility: CLINIC | Age: 36
Discharge: HOME | End: 2021-07-04
Attending: EMERGENCY MEDICINE | Admitting: EMERGENCY MEDICINE
Payer: COMMERCIAL

## 2021-07-04 VITALS
WEIGHT: 139.11 LBS | OXYGEN SATURATION: 99 % | HEART RATE: 109 BPM | BODY MASS INDEX: 23.87 KG/M2 | RESPIRATION RATE: 20 BRPM | SYSTOLIC BLOOD PRESSURE: 146 MMHG | TEMPERATURE: 99.7 F | DIASTOLIC BLOOD PRESSURE: 98 MMHG

## 2021-07-04 DIAGNOSIS — L03.113 RIGHT ARM CELLULITIS: ICD-10-CM

## 2021-07-04 PROCEDURE — 250N000013 HC RX MED GY IP 250 OP 250 PS 637: Performed by: EMERGENCY MEDICINE

## 2021-07-04 PROCEDURE — 93005 ELECTROCARDIOGRAM TRACING: CPT

## 2021-07-04 PROCEDURE — 99283 EMERGENCY DEPT VISIT LOW MDM: CPT

## 2021-07-04 RX ORDER — DOXYCYCLINE 100 MG/1
100 CAPSULE ORAL ONCE
Status: COMPLETED | OUTPATIENT
Start: 2021-07-04 | End: 2021-07-04

## 2021-07-04 RX ORDER — DOXYCYCLINE 100 MG/1
100 CAPSULE ORAL 2 TIMES DAILY
Qty: 14 CAPSULE | Refills: 0 | Status: SHIPPED | OUTPATIENT
Start: 2021-07-04 | End: 2021-07-11

## 2021-07-04 RX ADMIN — DOXYCYCLINE HYCLATE 100 MG: 100 CAPSULE ORAL at 20:45

## 2021-07-04 ASSESSMENT — ENCOUNTER SYMPTOMS
CHILLS: 0
FEVER: 0
SHORTNESS OF BREATH: 1

## 2021-07-05 LAB — INTERPRETATION ECG - MUSE: NORMAL

## 2021-07-05 NOTE — ED PROVIDER NOTES
History   Chief Complaint:  Wound Check       HPI   Wendy Fisher is a 36 year old female with history of drug abuse and overdose who presents with right arm swelling and redness after using methamphetamine this afteroon. The patient states that her right arm has had redness for the past few hours, and that the area is tender. She does report shortness of breath and has an elevated heart rate. This is the patient's first time using meth in 7 months. She also had a tattoo placed on her right forearm about one week ago. She denies fever, chills, and chest pain.       Review of Systems   Constitutional: Negative for chills and fever.   Respiratory: Positive for shortness of breath.    Cardiovascular: Negative for chest pain.   Skin: Positive for rash (Right arm).   All other systems reviewed and are negative.      Allergies:  The patient does not have any allergies.    Medications:  Buprenorphine HCl-naloxone HCl  Clonidine  Gabapentin  Levothyroxine  Naloxone  Bupropion  Fluoxetine  Levonorgestrel-ethinyl estradiol  Provigil    Past Medical History:    Abnormal pap smear  Active intravenous drug use  Anxiety  Methamphetamine abuse  Suicide attempt by drug overdose  Thyroid disease  Respiratory arrest  Respiratory acidoses     Past Surgical History:      Colonoscopy  [place] teeth extraction    Family History:    Father: hypertension, thyroid disease  Mother: depression, thyroid disorder    Social History:  The patient presented to the ED alone    Physical Exam     Patient Vitals for the past 24 hrs:   BP Temp Temp src Pulse Resp SpO2 Weight   219 (!) 146/98 -- -- 109 -- 99 % --   21 (!) 148/98 -- -- -- -- 97 % --   21 195 (!) 151/105 99.7  F (37.6  C) Oral 140 20 100 % 63.1 kg (139 lb 1.8 oz)       Physical Exam  Constitutional:  Alert, attentive  HENT:    Nose: Nose normal.    Mouth/Throat: Oropharynx is clear, mucous membranes are moist  Eyes:    EOM are normal.  CV:     Tachycardic, regular rhythm; no murmurs, rubs or gallups. 2+ radial and ulnar pulses to the bilateral upper extremities   Chest:   Effort normal and breath sounds normal.   GI:     There is no tenderness. No distension. Normal bowel sounds  Neurological:  5/5 strength to the radian, ulnar and median motor distributions;      sensation intact to light touch to the radian, ulnar and median distributions  MSK:   Linear apparent scratch marks to the right forearm dorsum, patient states that she was scratching the forearm.     Faint erythema over a 4 x 5 cm of skin to the medial aspect of the right antecubital fossa which is warm and mildly tender.  No fluctuance or induration.  No pain out of portion of exam or crepitus  Skin:    Skin is warm and dry.       Emergency Department Course   ECG  ECG taken at 2007, ECG read at 2018  Sinus tachycardia  Otherwise normal ECG   No significant change as compared to prior, dated 02/06/21.  Rate 115 bpm. IL interval 150 ms. QRS duration 92 ms. QT/QTc 326/450 ms. P-R-T axes 54 78 37.     Emergency Department Course:    Reviewed:  I reviewed nursing notes, vitals, past medical history and care everywhere    Assessments:  2005 I obtained history and examined the patient as noted above.    2102 I rechecked the patient and explained findings. After discussing discharge, the patient is  comfortable returning home.     Interventions:  2045 Doxycycline hyclate 100 mg PO    Disposition:  The patient was discharged to home.       Impression & Plan     Medical Decision Making:  This is a pleasant 36-year-old female with history of methamphetamine abuse with several months in remission until today when she used methamphetamine, who presents for evaluation of redness to her right arm.  There is an area to the right forearm that appears consistent with scratching and itching in the second area over the right antecubital fossa that may represent early cellulitis.  There is no systemic  toxicity.  Her tachycardia is consistent with recent methamphetamine use and her blood pressure and temperatures suggest against sepsis.  No signs or symptoms of necrotizing infection.  Plan doxycycline for likely cellulitis in the context of potential MRSA risk factors, primary care follow-up in 2 to 3 days, and return precautions for worse redness, pain, or any other concerns.        Diagnosis:    ICD-10-CM    1. Right arm cellulitis  L03.113        Discharge Medications:  Discharge Medication List as of 7/4/2021  9:04 PM      START taking these medications    Details   doxycycline hyclate (VIBRAMYCIN) 100 MG capsule Take 1 capsule (100 mg) by mouth 2 times daily for 7 days, Disp-14 capsule, R-0, E-Prescribe             Scribe Disclosure:  I, Carlos Alberto Carlos, am serving as a scribe at 8:08 PM on 7/4/2021 to document services personally performed by Chencho Chaidez MD based on my observations and the provider's statements to me.            Chencho Chaidez MD  07/04/21 2900

## 2021-07-05 NOTE — ED TRIAGE NOTES
Pt arrives to the ED due to right arm redness/swelling from about the elbow down to the hand that began 5 hrs ago. Pt denies any known injury to area. Denies area itching. Pt seen at [facility] and told to come here due to HR being 140 at the clinic. Pain a 3/10. CMS intact.

## 2022-08-18 NOTE — DISCHARGE INSTRUCTIONS
----- Message from Sol White PA-C sent at 8/18/2022 11:08 AM CDT -----  Let her know her calcium level is elevated. Get her to hold her multivitamin.   Have a repeat CMP along with alk phos isoenzymes, PTH in 2 weeks Discharge Instructions  Headache    You were seen today for a headache. Headaches may be caused by many different things such as muscle tension, sinus inflammation, anxiety and stress, having too little sleep, too much alcohol, some medical conditions or injury. You may have a migraine, which is caused by changes in the blood vessels in your head.  At this time your provider does not find that your headache is a sign of anything dangerous or life-threatening.  However, sometimes the signs of serious illness do not show up right away.      Generally, every Emergency Department visit should have a follow-up clinic visit with either a primary or a specialty clinic/provider. Please follow-up as instructed by your emergency provider today.    Return to the Emergency Department if:    You get a new fever of 100.4 F or higher.    Your headache gets much worse.    You get a stiff neck with your headache.    You get a new headache that is significantly different or worse than headaches you have had before.    You are vomiting (throwing up) and cannot keep food or water down.    You have blurry or double vision or other problems with your eyes.    You have a new weakness on one side of your body.    You have difficulty with balance which is new.    You or your family thinks you are confused.    You have a seizure.    What can I do to help myself?    Pain medications - You may take a pain medication such as Tylenol  (acetaminophen), Advil , Motrin  (ibuprofen) or Aleve  (naproxen).    Take a pain reliever as soon as you notice symptoms.  Starting medications as soon as you start to have symptoms may lessen the amount of pain you have.    Relaxing in a quiet, dark room may help.    Get enough sleep and eat meals regularly.    You may need to watch for certain foods or other things which may trigger your headaches.  Keeping a journal of your headaches and possible triggers may help you and your primary provider to identify  things which you should avoid which may be causing your headaches.  If you were given a prescription for medicine here today, be sure to read all of the information (including the package insert) that comes with your prescription.  This will include important information about the medicine, its side effects, and any warnings that you need to know about.  The pharmacist who fills the prescription can provide more information and answer questions you may have about the medicine.  If you have questions or concerns that the pharmacist cannot address, please call or return to the Emergency Department.   Remember that you can always come back to the Emergency Department if you are not able to see your regular provider in the amount of time listed above, if you get any new symptoms, or if there is anything that worries you.

## 2022-12-19 ENCOUNTER — TELEPHONE (OUTPATIENT)
Dept: BEHAVIORAL HEALTH | Facility: CLINIC | Age: 37
End: 2022-12-19

## 2022-12-19 NOTE — TELEPHONE ENCOUNTER
Pt is a(n) adult (18+ out of HS) Seeking as eval for Adult OLIVIA Assessment required for court. and is interested in Adult OLIVIA/Co-Occurring Program (Either In-Person or Virtual).  Appointment scheduled by:  Patient.  (If patient is self-pay, we much complete a Cost Estimate and save it to the pt chart)  Caller name:  Wendy    Caller phone #: [phone]  If in person, please state reason why:  NA  Brief reason for appt:  Needed for court    Cost estimate not get completed.  Contact information verified/updated: yes

## 2022-12-26 ENCOUNTER — HOSPITAL ENCOUNTER (OUTPATIENT)
Dept: BEHAVIORAL HEALTH | Facility: CLINIC | Age: 37
Discharge: HOME | End: 2022-12-26
Attending: FAMILY MEDICINE | Admitting: FAMILY MEDICINE
Payer: COMMERCIAL

## 2022-12-26 VITALS — BODY MASS INDEX: 22.2 KG/M2 | HEIGHT: 64 IN | WEIGHT: 130 LBS

## 2022-12-26 PROCEDURE — H0001 ALCOHOL AND/OR DRUG ASSESS: HCPCS | Mod: GT,95

## 2022-12-26 ASSESSMENT — ANXIETY QUESTIONNAIRES
1. FEELING NERVOUS, ANXIOUS, OR ON EDGE: NEARLY EVERY DAY
GAD7 TOTAL SCORE: 8
GAD7 TOTAL SCORE: 8
3. WORRYING TOO MUCH ABOUT DIFFERENT THINGS: SEVERAL DAYS
2. NOT BEING ABLE TO STOP OR CONTROL WORRYING: SEVERAL DAYS
6. BECOMING EASILY ANNOYED OR IRRITABLE: SEVERAL DAYS
5. BEING SO RESTLESS THAT IT IS HARD TO SIT STILL: SEVERAL DAYS
4. TROUBLE RELAXING: SEVERAL DAYS
7. FEELING AFRAID AS IF SOMETHING AWFUL MIGHT HAPPEN: NOT AT ALL

## 2022-12-26 ASSESSMENT — COLUMBIA-SUICIDE SEVERITY RATING SCALE - C-SSRS
6. HAVE YOU EVER DONE ANYTHING, STARTED TO DO ANYTHING, OR PREPARED TO DO ANYTHING TO END YOUR LIFE?: NO
2. HAVE YOU ACTUALLY HAD ANY THOUGHTS OF KILLING YOURSELF?: NO
TOTAL  NUMBER OF INTERRUPTED ATTEMPTS LIFETIME: NO
ATTEMPT LIFETIME: NO
1. HAVE YOU WISHED YOU WERE DEAD OR WISHED YOU COULD GO TO SLEEP AND NOT WAKE UP?: NO
TOTAL  NUMBER OF ABORTED OR SELF INTERRUPTED ATTEMPTS LIFETIME: NO

## 2022-12-26 ASSESSMENT — PAIN SCALES - GENERAL: PAINLEVEL: NO PAIN (0)

## 2022-12-26 ASSESSMENT — PATIENT HEALTH QUESTIONNAIRE - PHQ9: SUM OF ALL RESPONSES TO PHQ QUESTIONS 1-9: 5

## 2022-12-26 NOTE — PROGRESS NOTES
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 patient denied a history of homicidal ideation.     The patient denied a history of personal safety concerns.    The patient denied a history of assaultive behaviors.    The patient denied having any history of sexual assault behaviors.  The patient denied having any history of being registered as a sex offender.    The patient reported a history of unsafe motor vehicle operation, reported a history of using illicit drugs with unknown contents and purity and reported a history of having multiple accidental drug overdoses associated with substance use.  The patient reported a history of substance use and reported a history of impulsive decision making associated with mental health symptoms.  The patient reported the following protective factors: positive relationships positive family connections, forward/future oriented thinking, dedication to family/friends, safe and stable environment, adherence with  prescribed medication and daily obligations.    Risk Plan:  See Recommendations for Safety and Risk Management Plan    Review of Symptoms per patient report:  Substance Use:  No symptoms within the past 12-months.     Diagnostic Criteria:   2.)  There is persistent desire or unsuccessful efforts to cut down or control use of the substance.  Met for:  nicotine.  4.)  Craving, or a strong desire or urge to use the substance.  Met for:  nicotine.  10.)  Tolerance:  either a need for markedly increased amounts of the substance to achieve the desired effect or a markedly diminished effect with continued use of the same amount of the substance.  Met for:  nicotine.  11.)  Withdrawal:  either patient endorses characteristic withdrawal syndrome for the substance or the substance (or closely related substance) is taken to relieve or avoid withdrawal symptoms.  Met for:  nicotine.    Collateral Contact Summary:   Collateral contacts contributing to this assessment:  The patient's electronic medical records were reviewed at time of assessment.    This counselor talked with this patient's friend, Olinda Crespo, on 12/26/2022.  Olinda reported the patient had been doing very well over the past two years and to Olinda's best awareness the patient had not consumed any alcohol, heroin or any other non-prescribed mood altering chemicals during the past two years.    This counselor talked with this patient's mother, Brandee Song, on 12/27/2022.  Brandee reported the patient had been doing very well over the past two years and to Brandee's best awareness the patient had not consumed any alcohol, heroin or any other non-prescribed mood altering chemicals during the past two years.    If court related records were reviewed, summarize here: None    Information from collateral contacts supported/largely agreed with information from the client and associated risk ratings.    Information in this assessment was obtained from the  medical record and provided by the patient who is a fair historian.        The patient will have open access to her substance use disorder assessment medical record.    As evidenced by self report and criteria, the patient meets the following DSM-5 Diagnoses: (Sustained by DSM-5 Criteria Listed Above)      1.)  Opioid Use Disorder Severe - 304.00 (F11.20), in sustained remission  2.)  Alcohol Use Disorder Severe - 303.90 (F10.20), in sustained remission  3.)  Amphetamine Use Disorder Severe - 304.40 (F15.20), in sustained remission  4.)  Tobacco Use Disorder Moderate - 305.10 (F17.200)  5.)  Depression NOS, per patient self-report  6.)  Anxiety disorder NOS, per patient self-report  7.)  PTSD, per patient self-report  8.)  ADHD, per patient self-report    Specify if: In early remission:  After full criteria for alcohol/drug use disorder were previously met, none of the criteria for alcohol/drug use disorder have been met for at least 3 months but for less than 12 months (with the exception that Criterion A4,  Craving or a strong desire or urge to use alcohol/drug  may be met).     In sustained remission:   After full criteria for alcohol use disorder were previously met, none of the criteria for alcohol/drug use disorder have been met at any time during a period of 12 months or longer (with the exception that Criterion A4,  Craving or strong desire or urge to use alcohol/drug  may be met).     Specify if:   This additional specifier is used if the individual is in an environment where access to alcohol is restricted.    Mild: Presence of 2-3 symptoms  Moderate: Presence of 4-5 symptoms  Severe: Presence of 6 or more symptoms    Recommendations:     1. Plan for Safety and Risk Management:     It was recommended the patient call 911 or go to the local Emergency Department should there be any significant change in the above risk factors.            Report to child / adult protection services was NA.    2. OLIVIA  Referrals:      Recommendations:      1.)  It was recommended the patient continue to abstain from alcohol and from all other non-prescribed mood altering chemicals.   2.)  Follow all the terms and conditions of her court probation, including participating in alcohol and drug screenings with court probation as directed.  3.)  Attend and complete a minimum of a Level I Driving with Care 12-hour DWI class.    4.)  Attend 12-step or other recovery support group meetings for additional support as needed.      Below is a list with programs which offer both the Level I and the Level II Driving with Care DWI classes.      The Level I Driving with Care class is a 12-hour DWI class.    The Level II Driving with Care class is a 24-hour DWI class.      You have been recommended to attend the Level I Driving with Care 12-hour DWI class.    Program name Telephone number Website   [employer]    [phone] https://www.MyWealth/     [facility] 567-794-4971 www.accessbehavioralchange.XZERES/     [employer]   [phone] www.[facility].org/   [employer]. 890.716.9504 http://www.drivingwithcare.org/        [place] [facility] 896-800-7600 https://[facility].org/resources/driving-with-care/      [facility] [phone] https://www.[place].XZERES/driving-with-care-class/          There are additional programs which offer these Level I and Level II Driving with Care classes, so if none of the above programs work for you to attend a class, you can use an Internet search to find additional programs which offer the Driving with Care classes.      It is your responsibility to set up a time to attend and complete the recommended level of the Driving with Care class and it is your responsibility to have the program you attend send a letter of completion of the Driving with Care class to your court , your court probation  department and/or to your  as needed.    The patient reported she was willing to follow the above recommendations.      The patient would like the following family or other support people involved in their treatment:  None at this time.  The patient has a history of opioid abuse and was given treatment options, including Medication Assisted Treatment and information on the risks of opioid use disorder including recognizing and responding to opioid overdose.  The patient is currently receiving the following services: The patient is currently working with medical providers and is receiving Medication Assisted Therapy with prescribed Suboxone      3.  Mental Health Referrals:     The patient would benefit from continuing to follow all of the recommendations of her mental health providers.    4. Cultural Concerns:    The patient did not identify having any cultural concerns regarding mental health, physical health, or substance use issues.     5. Recommendations for treatment focus:      Alcohol / Substance Use - See #2. OLIVIA Referrals above for details on recommendations.    Clinical Substantiation for the above recommendations: The patient would benefit from increasing her awareness regarding the risks of substance abuse and from working on skills to minimize the potential for having future negative substance use consequences by attending a Level I Driving with Care 12-hour DWI class.     Provider Name/ Credentials:  ISMA Gil  December 26, 2022

## 2022-12-27 ENCOUNTER — TELEPHONE (OUTPATIENT)
Dept: BEHAVIORAL HEALTH | Facility: CLINIC | Age: 37
End: 2022-12-27
Payer: COMMERCIAL

## 2022-12-27 NOTE — TELEPHONE ENCOUNTER
Wendy Fisher Release of Information requests were e-mailed to the [place] [address] OB's at [email or web address] on 12/27/2022 with requests for the following releases:    Patient's e-mail address: [email or web address]    1.) OLIVIA - [id number] - Collateral Contact & Emergency Contact   Brandee Song (mother)  Tel #: [phone]    2.) OLIVIA - [id number] - Collateral Contact & Emergency Contact  Olinda Crespo (friend)  Tel #: [phone]    3.) OLIVIA - [id number] - Exchange of MH & OLIVIA Records  [facility] Probation   Tel #: [phone]  Fax #: [phone]  Email: [email or web address].

## 2022-12-28 NOTE — TELEPHONE ENCOUNTER
An acknowledgement letter with the full recommendations resulting from this patient's 12/26/2022 substance use disorder assessment was SECURELY e-mailed to this patient on 12/27/2022.  See below:    Wendy,    I have attached an acknowledgement letter with the full recommendations resulting from your 12/26/2022 substance use disorder assessment to this e-mail.  Please feel free to contact me if you have any questions or concerns.    Thanks,    YUNIOR Rose, [facility]  CD Evaluation Counselor  [address] [address]  [email or web address]  Tel: [phone]  Fax: [phone]  Gender pronouns: he/him  Employed by: [employer]

## 2022-12-28 NOTE — TELEPHONE ENCOUNTER
This patient's 12/26/2022 substance use disorder assessment was SECURELY e-mailed to [facility] @ [email or web address] on 12/27/2022.  See below:    [facility],    I have attached a copy of Wendy Fisher's 12/26/2022 substance use disorder assessment to this e-mail for case management.  Please feel free to contact me if you have any questions or concerns.    Thanks,    YUNIOR Rose, [facility]  CD Evaluation Counselor  [address] [address]  [email or web address]  Tel: [phone]  Fax: [phone]  Gender pronouns: he/him  Employed by: [employer]

## 2022-12-28 NOTE — PROGRESS NOTES
[facility]  [facility] [place] [address]  12/27/2022    Wendy Fisher  [address] [facility] [address]      Wendy,    This is to verify that Wendy Fisher (1985) participated in a substance use disorder assessment via a video visit with YUNIOR Whitten/ISMA at [facility] in [place], [place] on 12/26/2022.    Recommendations:  1.)  It was recommended the patient continue to abstain from alcohol and from all other non-prescribed mood altering chemicals.   2.)  Follow all the terms and conditions of her court probation, including participating in alcohol and drug screenings with court probation as directed.  3.)  Attend and complete a minimum of a Level I Driving with Care 12-hour DWI class.    4.)  Attend 12-step or other recovery support group meetings for additional support as needed.      Below is a list with programs which offer both the Level I and the Level II Driving with Care DWI classes.      The Level I Driving with Care class is a 12-hour DWI class.    The Level II Driving with Care class is a 24-hour DWI class.      You have been recommended to attend the Level I Driving with Care 12-hour DWI class.    Program name Telephone number Website   [employer]    [phone] https://www.[facility]/     [facility] [phone] www.accessbehavioralchange.com/     [employer]   [phone] www.[employer].org/   [employer] [facility]. 429.665.4252 http://www.drivingwithcare.org/        [place] [facility] 619-912-9720 https://[facility].org/resources/driving-with-care/      [facility] 112-081-8042 https://www.[employer]/driving-with-care-class/          There are additional programs which offer these Level I and Level II Driving with Care classes, so if none of the above programs work for you to attend a class, you can use an Internet search to find additional  programs which offer the Driving with Care classes.      It is your responsibility to set up a time to attend and complete the recommended level of the Driving with Care class and it is your responsibility to have the program you attend send a letter of completion of the Driving with Care class to your court , your court probation department and/or to your  as needed.    The patient reported she was willing to follow the above recommendations.      If you have any additional questions or concerns, please feel free to contact me by any of the contact methods listed below.    Sincerely,    YUNIOR Rose, [facility]  CD Evaluation Counselor  [facility]  [address] [place] [address]  [email or web address]  Tel: [phone]  Fax: [phone]

## 2022-12-28 NOTE — TELEPHONE ENCOUNTER
This patient's OLIVIA Assessment DAANES information was entered directly into the [place] Department of Human Services DAANES website on 12/27/2022.  Assessment ID: [id number]